# Patient Record
Sex: FEMALE | Race: WHITE | URBAN - METROPOLITAN AREA
[De-identification: names, ages, dates, MRNs, and addresses within clinical notes are randomized per-mention and may not be internally consistent; named-entity substitution may affect disease eponyms.]

---

## 2020-11-03 LAB — HBA1C MFR BLD: 8.7 %

## 2021-06-09 LAB
ALBUMIN SERPL-MCNC: 3.9 G/DL
ALP BLD-CCNC: 69 U/L
ALT SERPL-CCNC: 19 U/L
ANION GAP SERPL CALCULATED.3IONS-SCNC: 14 MMOL/L
AST SERPL-CCNC: 45 U/L
BASOPHILS ABSOLUTE: 2.98 /ΜL
BASOPHILS RELATIVE PERCENT: 5 %
BILIRUB SERPL-MCNC: 0.7 MG/DL (ref 0.1–1.4)
BUN BLDV-MCNC: 30 MG/DL
CALCIUM SERPL-MCNC: 9.5 MG/DL
CHLORIDE BLD-SCNC: 103 MMOL/L
CO2: 23 MMOL/L
CREAT SERPL-MCNC: 1.58 MG/DL
EOSINOPHILS ABSOLUTE: 1.79 /ΜL
EOSINOPHILS RELATIVE PERCENT: 3 %
GFR CALCULATED: NORMAL
GLUCOSE BLD-MCNC: 168 MG/DL
HCT VFR BLD CALC: 43.6 % (ref 36–46)
HEMOGLOBIN: 13.7 G/DL (ref 12–16)
LYMPHOCYTES ABSOLUTE: 5.96 /ΜL
LYMPHOCYTES RELATIVE PERCENT: 10 %
MCH RBC QN AUTO: 29.4 PG
MCHC RBC AUTO-ENTMCNC: 31.4 G/DL
MCV RBC AUTO: 93.6 FL
MONOCYTES ABSOLUTE: 2.98 /ΜL
MONOCYTES RELATIVE PERCENT: 5 %
NEUTROPHILS ABSOLUTE: 36.36 /ΜL
NEUTROPHILS RELATIVE PERCENT: 61 %
PLATELET # BLD: 347 K/ΜL
PMV BLD AUTO: 10.9 FL
POTASSIUM SERPL-SCNC: 4.4 MMOL/L
RBC # BLD: 4.66 10^6/ΜL
SODIUM BLD-SCNC: 140 MMOL/L
TOTAL PROTEIN: 7.3
WBC # BLD: 59.59 10^3/ML

## 2021-06-10 ENCOUNTER — OFFICE VISIT (OUTPATIENT)
Dept: INTERNAL MEDICINE | Age: 66
End: 2021-06-10
Payer: COMMERCIAL

## 2021-06-10 VITALS
HEART RATE: 87 BPM | TEMPERATURE: 96.4 F | WEIGHT: 202.4 LBS | HEIGHT: 60 IN | DIASTOLIC BLOOD PRESSURE: 60 MMHG | SYSTOLIC BLOOD PRESSURE: 130 MMHG | BODY MASS INDEX: 39.74 KG/M2 | OXYGEN SATURATION: 97 %

## 2021-06-10 DIAGNOSIS — B35.1 NAIL FUNGUS: ICD-10-CM

## 2021-06-10 DIAGNOSIS — Z86.39 HISTORY OF DIABETES MELLITUS: ICD-10-CM

## 2021-06-10 DIAGNOSIS — S99.921A INJURY OF RIGHT GREAT TOE, INITIAL ENCOUNTER: Primary | ICD-10-CM

## 2021-06-10 PROCEDURE — 99203 OFFICE O/P NEW LOW 30 MIN: CPT | Performed by: NURSE PRACTITIONER

## 2021-06-10 RX ORDER — LISINOPRIL 20 MG/1
20 TABLET ORAL DAILY
COMMUNITY
End: 2021-08-20 | Stop reason: SDUPTHER

## 2021-06-10 SDOH — ECONOMIC STABILITY: FOOD INSECURITY: WITHIN THE PAST 12 MONTHS, YOU WORRIED THAT YOUR FOOD WOULD RUN OUT BEFORE YOU GOT MONEY TO BUY MORE.: NEVER TRUE

## 2021-06-10 SDOH — ECONOMIC STABILITY: FOOD INSECURITY: WITHIN THE PAST 12 MONTHS, THE FOOD YOU BOUGHT JUST DIDN'T LAST AND YOU DIDN'T HAVE MONEY TO GET MORE.: NEVER TRUE

## 2021-06-10 ASSESSMENT — PATIENT HEALTH QUESTIONNAIRE - PHQ9
SUM OF ALL RESPONSES TO PHQ QUESTIONS 1-9: 0
SUM OF ALL RESPONSES TO PHQ9 QUESTIONS 1 & 2: 0
SUM OF ALL RESPONSES TO PHQ QUESTIONS 1-9: 0
SUM OF ALL RESPONSES TO PHQ QUESTIONS 1-9: 0
1. LITTLE INTEREST OR PLEASURE IN DOING THINGS: 0
2. FEELING DOWN, DEPRESSED OR HOPELESS: 0

## 2021-06-10 ASSESSMENT — SOCIAL DETERMINANTS OF HEALTH (SDOH): HOW HARD IS IT FOR YOU TO PAY FOR THE VERY BASICS LIKE FOOD, HOUSING, MEDICAL CARE, AND HEATING?: NOT HARD AT ALL

## 2021-06-10 NOTE — PROGRESS NOTES
Nayan Shen (:  1955) is a 76 y.o. female, New patient, here for evaluation of the following chief complaint(s):  Toe Injury (hit toe, pt states she is a diabetic and is afraid an infection x2-3 days ago pt states she has leukimia  )      Vitals:    06/10/21 1628   BP: 130/60   Pulse: 87   Temp: 96.4 °F (35.8 °C)   SpO2: 97%       ASSESSMENT/PLAN:  1. Injury of right great toe, initial encounter  -     Cheyenne Limon DPM, Podiatry, D[place]  2. Nail fungus  -     Cheyenne Limon DPM, Podiatry, Dima  3. History of diabetes mellitus  -     Cheyenne Limon DPM, Podiatrsharmila, Dima        Return for follow up with PCP, establish care. SUBJECTIVE/OBJECTIVE:    Other  This is a new problem. Episode onset: x2-3 days ago, hit Right great toe on end table, has some blood under the nail. Worried about infection, pt diabetic. The problem occurs constantly. The problem has been unchanged. Associated symptoms include joint swelling and myalgias. Pertinent negatives include no arthralgias, chest pain, chills, congestion, coughing, fatigue, fever, neck pain, numbness, rash, sore throat or weakness. The symptoms are aggravated by walking. She has tried nothing for the symptoms. Review of Systems   Constitutional: Negative for chills, fatigue and fever. HENT: Negative for congestion, facial swelling, sore throat and trouble swallowing. Respiratory: Negative for cough, chest tightness, shortness of breath and wheezing. Cardiovascular: Negative for chest pain and palpitations. Musculoskeletal: Positive for joint swelling and myalgias. Negative for arthralgias and neck pain. Skin: Positive for wound. Negative for pallor and rash. Neurological: Negative for weakness, light-headedness and numbness. Physical Exam  Vitals reviewed. Constitutional:       General: She is not in acute distress. Appearance: She is well-groomed.    Cardiovascular:      Rate and Rhythm: Normal rate and regular rhythm. Heart sounds: Normal heart sounds, S1 normal and S2 normal.   Pulmonary:      Effort: Pulmonary effort is normal. No respiratory distress. Breath sounds: Normal air entry. No decreased breath sounds, wheezing, rhonchi or rales. Feet:      Right foot:      Toenail Condition: Right toenails are abnormally thick and long. Fungal disease present. Left foot:      Toenail Condition: Left toenails are abnormally thick and long. Fungal disease present. Comments: Pt able to move/bend right great toe. No bony pain. No discoloration or bruising noted. Scant amount of blood noted near the mid to lateral cuticle. Pt denied increased pressure under the nail. Bilateral toenails are thick, yellowed, and long. No drainage, erythema, or signs of bacterial infection noted. Skin:     General: Skin is warm and dry. Neurological:      Mental Status: She is alert and oriented to person, place, and time. Psychiatric:         Mood and Affect: Mood normal.         Behavior: Behavior is cooperative. An electronic signature was used to authenticate this note.     --LELAND Hargrove

## 2021-06-11 ASSESSMENT — ENCOUNTER SYMPTOMS
FACIAL SWELLING: 0
TROUBLE SWALLOWING: 0
SORE THROAT: 0
SHORTNESS OF BREATH: 0
WHEEZING: 0
CHEST TIGHTNESS: 0
COUGH: 0

## 2021-06-23 ENCOUNTER — OFFICE VISIT (OUTPATIENT)
Dept: INTERNAL MEDICINE | Age: 66
End: 2021-06-23
Payer: COMMERCIAL

## 2021-06-23 VITALS
TEMPERATURE: 97.2 F | SYSTOLIC BLOOD PRESSURE: 128 MMHG | OXYGEN SATURATION: 96 % | WEIGHT: 208 LBS | DIASTOLIC BLOOD PRESSURE: 76 MMHG | BODY MASS INDEX: 40.84 KG/M2 | HEART RATE: 66 BPM | HEIGHT: 60 IN

## 2021-06-23 DIAGNOSIS — E03.9 ACQUIRED HYPOTHYROIDISM: ICD-10-CM

## 2021-06-23 DIAGNOSIS — Z79.4 DIABETES MELLITUS TYPE 2, INSULIN DEPENDENT (HCC): ICD-10-CM

## 2021-06-23 DIAGNOSIS — E11.9 DIABETES MELLITUS TYPE 2, INSULIN DEPENDENT (HCC): ICD-10-CM

## 2021-06-23 DIAGNOSIS — I10 ESSENTIAL HYPERTENSION: ICD-10-CM

## 2021-06-23 LAB — HBA1C MFR BLD: 5.7 %

## 2021-06-23 PROCEDURE — 99214 OFFICE O/P EST MOD 30 MIN: CPT | Performed by: PHYSICIAN ASSISTANT

## 2021-06-23 PROCEDURE — 83036 HEMOGLOBIN GLYCOSYLATED A1C: CPT | Performed by: PHYSICIAN ASSISTANT

## 2021-06-23 RX ORDER — LEVOTHYROXINE SODIUM 0.12 MG/1
125 TABLET ORAL DAILY
Qty: 30 TABLET | Refills: 0 | Status: SHIPPED | OUTPATIENT
Start: 2021-06-23 | End: 2021-07-23

## 2021-06-23 RX ORDER — INSULIN GLARGINE 100 [IU]/ML
26 INJECTION, SOLUTION SUBCUTANEOUS NIGHTLY
Qty: 2 VIAL | Refills: 0 | Status: SHIPPED | OUTPATIENT
Start: 2021-06-23 | End: 2021-09-02

## 2021-06-23 RX ORDER — LEVOTHYROXINE SODIUM 0.12 MG/1
125 TABLET ORAL DAILY
COMMUNITY
End: 2021-06-23 | Stop reason: SDUPTHER

## 2021-06-23 RX ORDER — FUROSEMIDE 20 MG/1
20 TABLET ORAL 2 TIMES DAILY
COMMUNITY
End: 2022-07-05 | Stop reason: ALTCHOICE

## 2021-06-23 RX ORDER — INSULIN GLARGINE 100 [IU]/ML
INJECTION, SOLUTION SUBCUTANEOUS NIGHTLY
COMMUNITY
End: 2021-06-23 | Stop reason: SDUPTHER

## 2021-06-23 NOTE — PROGRESS NOTES
21    Flavia Horton (: 1955) is a 78 y.o. female, Established patient, here for evaluation of the following chief complaint(s):  New Patient (Pt needs refills on all medications. Prev pcp Dr. Taya Jason) and Health Maintenance (Pt refused mammogram, Dexa scan, Colonoscopy. Pt had BW done at  approx 2wks ago. )      HPI    New patient to establish care  Prev PCP- MIGUEL ANGEL Levine , last visit 2020     Dm- on Metformin 500 mg BID, Lantus 24-26 units HS, Novolog- 15 units with meals . No incidence of hypoglycemia for months . Fasting range- 130's-150's  , Does not take metformin . Last A1C was 8.7 in 2020, so patient began to eat better . No episodes of hypoglycemia   Hypothyroid- on Synthroid  HTN- stable on Lisinopril       Review of Systems   Constitutional: Negative. HENT: Negative. Respiratory: Negative. Cardiovascular: Negative. Gastrointestinal: Negative. Endocrine: Negative. Genitourinary: Negative. Musculoskeletal: Negative. Neurological: Negative. Psychiatric/Behavioral: Negative. Prior to Visit Medications    Medication Sig Taking?  Authorizing Provider   vitamin D (CHOLECALCIFEROL) 25 MCG (1000 UT) TABS tablet Take 1,000 Units by mouth daily Yes Historical Provider, MD   furosemide (LASIX) 20 MG tablet Take 20 mg by mouth 2 times daily Yes Historical Provider, MD   levothyroxine (SYNTHROID) 125 MCG tablet Take 1 tablet by mouth Daily Yes PAULY Vasquez   insulin glargine (LANTUS) 100 UNIT/ML injection vial Inject 26 Units into the skin nightly Yes PAULY Vasquez   insulin aspart (NOVOLOG) 100 UNIT/ML injection vial inject 12 units subcutaneously three times a day before meals Yes PAULY Vasquez   lisinopril (PRINIVIL;ZESTRIL) 20 MG tablet Take 20 mg by mouth daily Yes Historical Provider, MD   imatinib (GLEEVEC) 100 MG chemo tablet Take 200 mg by mouth daily  Patient not taking: Reported on 2021  Historical Provider, MD appearance. HENT:      Head: Normocephalic and atraumatic. Eyes:      Extraocular Movements: Extraocular movements intact. Conjunctiva/sclera: Conjunctivae normal.      Pupils: Pupils are equal, round, and reactive to light. Cardiovascular:      Rate and Rhythm: Normal rate and regular rhythm. Pulses: Normal pulses. Heart sounds: Normal heart sounds. Pulmonary:      Effort: Pulmonary effort is normal.      Breath sounds: Normal breath sounds. Abdominal:      General: Bowel sounds are normal.      Palpations: Abdomen is soft. Musculoskeletal:         General: Normal range of motion. Cervical back: Normal range of motion and neck supple. Skin:     General: Skin is warm. Neurological:      General: No focal deficit present. Mental Status: She is alert. Psychiatric:         Mood and Affect: Mood normal.         Behavior: Behavior normal.         Thought Content: Thought content normal.         Judgment: Judgment normal.       Results for POC orders placed in visit on 06/23/21   POCT glycosylated hemoglobin (Hb A1C)   Result Value Ref Range    Hemoglobin A1C 5.7 %           ASSESSMENT/PLAN:  1. Diabetes mellitus type 2, insulin dependent (HCC)  - POCT glycosylated hemoglobin (Hb A1C)- stable at 5.7 , without hypoglycemia   - continue medication and diet   - reduce to 12 units if insulin with meals , per the prescription, to prevent hypoglycemia   - monitor glucose closely, and return if any reading under 100 with symptoms     2. Essential hypertension  - controlled , continue Lisinopril     3. Acquired hypothyroidism  - stable, continue Synthroid , needs labs           Return for AWV, fasting labs. An  electronic signature was used to authenticate this note.     --PAULY Crane on 1/29/2021 at 12:12 PM

## 2021-06-30 ASSESSMENT — ENCOUNTER SYMPTOMS
RESPIRATORY NEGATIVE: 1
GASTROINTESTINAL NEGATIVE: 1

## 2021-07-01 ENCOUNTER — VIRTUAL VISIT (OUTPATIENT)
Dept: INTERNAL MEDICINE | Age: 66
End: 2021-07-01
Payer: COMMERCIAL

## 2021-07-01 DIAGNOSIS — G47.00 INSOMNIA, UNSPECIFIED TYPE: Primary | ICD-10-CM

## 2021-07-01 PROCEDURE — 99442 PR PHYS/QHP TELEPHONE EVALUATION 11-20 MIN: CPT | Performed by: PHYSICIAN ASSISTANT

## 2021-07-01 RX ORDER — TRAZODONE HYDROCHLORIDE 50 MG/1
50 TABLET ORAL NIGHTLY
Qty: 30 TABLET | Refills: 0 | Status: SHIPPED | OUTPATIENT
Start: 2021-07-01 | End: 2021-07-26

## 2021-07-01 NOTE — PROGRESS NOTES
Gino Carpenter (: 1955) is a 78 y.o. female, Established patient, here for evaluation of the following chief complaint(s): Insomnia (Pt states that she has not slept in 3wks.)        ASSESSMENT/PLAN:  1. Insomnia, unspecified type  - will start low dose Trazodone , and she can split the tablets if needed   - traZODone (DESYREL) 50 MG tablet; Take 1 tablet by mouth nightly  Dispense: 30 tablet; Refill: 0  - f/u if needed     No follow-ups on file. SUBJECTIVE/OBJECTIVE:  HPI      Insomnia x 3 weeks  States this started all of a sudden   States she is only getting about an hour of sleep       Review of Systems   Psychiatric/Behavioral: Positive for sleep disturbance. Negative for confusion, decreased concentration and dysphoric mood. The patient is not nervous/anxious. No flowsheet data found. Physical Exam  Pulmonary:      Effort: Pulmonary effort is normal.   Neurological:      General: No focal deficit present. Mental Status: She is alert. Psychiatric:         Mood and Affect: Mood normal.         Behavior: Behavior normal.         Thought Content: Thought content normal.         Judgment: Judgment normal.         There were no vitals filed for this visit. On this date 2021 I have spent 11 minutes reviewing previous notes, test results and face to face (virtual) with the patient discussing the diagnosis and importance of compliance with the treatment plan as well as documenting on the day of the visit. Gino Carpenter is a 78 y.o. female being evaluated by a Virtual Visit (video visit) encounter to address concerns as mentioned above. A caregiver was present when appropriate. Due to this being a TeleHealth encounter (During [facility]- public health emergency), evaluation of the following organ systems was limited: Vitals/Constitutional/EENT/Resp/CV/GI//MS/Neuro/Skin/Heme-Lymph-Imm.   Pursuant to the emergency declaration under the [address] [facility], [facility] waiver authority and the [facility] Appropriations Act, this Virtual Visit was conducted with patient's (and/or legal guardian's) consent, to reduce the patient's risk of exposure to COVID-19 and provide necessary medical care. The patient (and/or legal guardian) has also been advised to contact this office for worsening conditions or problems, and seek emergency medical treatment and/or call 911 if deemed necessary. Patient identification was verified at the start of the visit: Yes    Services were provided through a video synchronous discussion virtually to substitute for in-person clinic visit. Patient was located at home and provider was located in office or at home. An electronic signature was used to authenticate this note.     --PAULY Elias

## 2021-07-26 DIAGNOSIS — G47.00 INSOMNIA, UNSPECIFIED TYPE: ICD-10-CM

## 2021-07-26 NOTE — TELEPHONE ENCOUNTER
Requesting medication refill. Please approve or deny this request.    Rx requested:  Requested Prescriptions     Pending Prescriptions Disp Refills    traZODone (DESYREL) 50 MG tablet [Pharmacy Med Name: TRAZODONE 50 MG TABLET] 60 tablet 0     Sig: take 1 tablet by mouth nightly       Last Office Visit, reason seen and by who:   7/1/2021, Insomnia, Dr. Parag Luna: Darnell Slates     Return if symptoms worsen or fail to improve.         PATIENT CONTACTED FOR A FOLLOW UP APPT:   YES OR NO    See Below    Next Visit Date:  Future Appointments   Date Time Provider Radha Vuong   7/28/2021  9:30 AM PAULY Infante [facility]

## 2021-07-27 RX ORDER — TRAZODONE HYDROCHLORIDE 50 MG/1
50 TABLET ORAL NIGHTLY
Qty: 60 TABLET | Refills: 0 | Status: SHIPPED | OUTPATIENT
Start: 2021-07-27 | End: 2021-09-10 | Stop reason: ALTCHOICE

## 2021-07-27 NOTE — TELEPHONE ENCOUNTER
Ria HUNG Mlox [address] [facility] Clinical Staff  Subject: Refill Request     QUESTIONS   Name of Medication? levothyroxine (SYNTHROID) 125 MCG tablet   Patient-reported dosage and instructions? 1x a day   How many days do you have left? 1   Preferred Pharmacy? [address] [place] [address] phone number (if available)?  [phone]

## 2021-07-28 NOTE — TELEPHONE ENCOUNTER
----- Message from Rod Sherman sent at 7/27/2021  2:52 PM EDT -----  Subject: Refill Request    QUESTIONS  Name of Medication? levothyroxine (SYNTHROID) 125 MCG tablet  Patient-reported dosage and instructions? 1x a day   How many days do you have left? 1  Preferred Pharmacy? [address] phone number (if available)? [phone]  ---------------------------------------------------------------------------  --------------  Lizet ERNANDEZ  What is the best way for the office to contact you? OK to leave message on   voicemail  Preferred Call Back Phone Number?  [phone]

## 2021-08-02 ENCOUNTER — TELEPHONE (OUTPATIENT)
Dept: INTERNAL MEDICINE | Age: 66
End: 2021-08-02

## 2021-08-02 NOTE — LETTER
[facility]  [place] [address]  Phone: [phone]  Fax: [phone]      August 20, 2021     Zoraida Francisco [address] [address] [address] [place] [address]      Dear Relda Dancer:    I am writing you because I have been informed of your missed appointment(s). We ask that you please call 24 hours in advance if you are unable to make your appointment so that we can give that time to another patient in need. We care about you and the management of your healthcare and want to make sure that you follow up as recommended. I have to also mention, that in an effort to provide quality care and timely appointments to all my patients, it is our policy that patients be discharged from the practice if they do not show for three scheduled appointments. You would be informed of this termination by mail, and would have 30 days from the date of discharge to locate another physician. We're sorry you were unable to keep your appointment and hope that you are doing well. We would like to continue treating your healthcare needs. Please call the office to let us know your plans for treatment and to reschedule your appointment.      Sincerely,        PAULY Solorio

## 2021-08-06 ENCOUNTER — TELEPHONE (OUTPATIENT)
Dept: INTERNAL MEDICINE | Age: 66
End: 2021-08-06

## 2021-08-06 NOTE — TELEPHONE ENCOUNTER
Pharmacist called, says that it is recommended that the pt be on a statin because of age group and Dx of Dm. Please review at next OV if necessary. Pharmacist no. [phone] Eleno Johnson. If not concerned with putting a pt on a statin please disregard this message.

## 2021-08-20 NOTE — TELEPHONE ENCOUNTER
Requesting medication refill. Please approve or deny this request.    Rx requested:  Requested Prescriptions     Pending Prescriptions Disp Refills    lisinopril (PRINIVIL;ZESTRIL) 20 MG tablet 30 tablet      Sig: Take 1 tablet by mouth daily       Last Office Visit:   7/1/2021        REASON LAST SEEN AND BY WHO:    Insomnia; [facility]     FOLLOW UP PLAN FROM LAST PCP VISIT: COPY AND PASTE FROM LAST PCP NOTE     Return if symptoms worsen or fail to improve. PATIENT CONTACTED FOR A FOLLOW UP APPT: YES OR NO    NO     Next Visit Date:  No future appointments.

## 2021-08-23 RX ORDER — LISINOPRIL 20 MG/1
20 TABLET ORAL DAILY
Qty: 90 TABLET | Refills: 0 | Status: SHIPPED | OUTPATIENT
Start: 2021-08-23 | End: 2021-09-10 | Stop reason: SDUPTHER

## 2021-09-01 NOTE — TELEPHONE ENCOUNTER
Requesting medication refill. Please approve or deny this request.    Rx requested:  Requested Prescriptions     Pending Prescriptions Disp Refills    insulin aspart (NOVOLOG) 100 UNIT/ML injection vial [Pharmacy Med Name: NOVOLOG 100 UNIT/ML VIAL] 10 mL      Sig: inject 12 units subcutaneously three times a day before meals    LANTUS 100 UNIT/ML injection vial [Pharmacy Med Name: LANTUS 100 UNIT/ML VIAL] 20 mL      Sig: inject 26 units subcutaneously every evening       Last Office Visit:   7/1/2021        REASON LAST SEEN AND BY WHO:    insomnia     FOLLOW UP PLAN FROM LAST PCP VISIT: COPY AND PASTE FROM LAST PCP NOTE    none      PATIENT CONTACTED FOR A FOLLOW UP APPT: YES OR NO    no    Next Visit Date:  No future appointments.

## 2021-09-02 RX ORDER — INSULIN GLARGINE 100 [IU]/ML
INJECTION, SOLUTION SUBCUTANEOUS
Qty: 20 ML | Refills: 0 | Status: SHIPPED | OUTPATIENT
Start: 2021-09-02 | End: 2021-09-10

## 2021-09-03 ENCOUNTER — TELEPHONE (OUTPATIENT)
Dept: INTERNAL MEDICINE | Age: 66
End: 2021-09-03

## 2021-09-10 ENCOUNTER — OFFICE VISIT (OUTPATIENT)
Dept: INTERNAL MEDICINE | Age: 66
End: 2021-09-10
Payer: COMMERCIAL

## 2021-09-10 VITALS
SYSTOLIC BLOOD PRESSURE: 128 MMHG | WEIGHT: 202 LBS | BODY MASS INDEX: 39.66 KG/M2 | HEART RATE: 75 BPM | TEMPERATURE: 97.4 F | OXYGEN SATURATION: 97 % | HEIGHT: 60 IN | DIASTOLIC BLOOD PRESSURE: 67 MMHG

## 2021-09-10 DIAGNOSIS — E11.9 DIABETES MELLITUS TYPE 2, INSULIN DEPENDENT (HCC): ICD-10-CM

## 2021-09-10 DIAGNOSIS — Z12.31 ENCOUNTER FOR SCREENING MAMMOGRAM FOR MALIGNANT NEOPLASM OF BREAST: ICD-10-CM

## 2021-09-10 DIAGNOSIS — G47.00 INSOMNIA, UNSPECIFIED TYPE: ICD-10-CM

## 2021-09-10 DIAGNOSIS — Z12.11 COLON CANCER SCREENING: ICD-10-CM

## 2021-09-10 DIAGNOSIS — Z79.4 DIABETES MELLITUS TYPE 2, INSULIN DEPENDENT (HCC): ICD-10-CM

## 2021-09-10 DIAGNOSIS — I10 ESSENTIAL HYPERTENSION: ICD-10-CM

## 2021-09-10 DIAGNOSIS — Z00.00 ROUTINE GENERAL MEDICAL EXAMINATION AT A HEALTH CARE FACILITY: Primary | ICD-10-CM

## 2021-09-10 DIAGNOSIS — E03.9 ACQUIRED HYPOTHYROIDISM: ICD-10-CM

## 2021-09-10 DIAGNOSIS — Z78.0 ASYMPTOMATIC MENOPAUSAL STATE: ICD-10-CM

## 2021-09-10 PROCEDURE — G0438 PPPS, INITIAL VISIT: HCPCS | Performed by: PHYSICIAN ASSISTANT

## 2021-09-10 RX ORDER — LEVOTHYROXINE SODIUM 0.12 MG/1
TABLET ORAL
Qty: 90 TABLET | Refills: 1 | Status: SHIPPED | OUTPATIENT
Start: 2021-09-10 | End: 2022-04-21

## 2021-09-10 RX ORDER — DOXEPIN HYDROCHLORIDE 25 MG/1
25 CAPSULE ORAL NIGHTLY
Qty: 30 CAPSULE | Refills: 0 | Status: SHIPPED | OUTPATIENT
Start: 2021-09-10 | End: 2021-10-09

## 2021-09-10 RX ORDER — INSULIN GLARGINE 100 [IU]/ML
26 INJECTION, SOLUTION SUBCUTANEOUS NIGHTLY
Qty: 5 PEN | Refills: 3 | Status: SHIPPED | OUTPATIENT
Start: 2021-09-10 | End: 2022-02-25 | Stop reason: SDUPTHER

## 2021-09-10 RX ORDER — LISINOPRIL 20 MG/1
20 TABLET ORAL DAILY
Qty: 90 TABLET | Refills: 1 | Status: SHIPPED | OUTPATIENT
Start: 2021-09-10 | End: 2022-03-31

## 2021-09-10 ASSESSMENT — PATIENT HEALTH QUESTIONNAIRE - PHQ9
SUM OF ALL RESPONSES TO PHQ QUESTIONS 1-9: 1
SUM OF ALL RESPONSES TO PHQ QUESTIONS 1-9: 1
SUM OF ALL RESPONSES TO PHQ9 QUESTIONS 1 & 2: 1
SUM OF ALL RESPONSES TO PHQ QUESTIONS 1-9: 1
2. FEELING DOWN, DEPRESSED OR HOPELESS: 1
1. LITTLE INTEREST OR PLEASURE IN DOING THINGS: 0

## 2021-09-10 ASSESSMENT — LIFESTYLE VARIABLES: HOW OFTEN DO YOU HAVE A DRINK CONTAINING ALCOHOL: 0

## 2021-09-10 NOTE — PROGRESS NOTES
Medicare Annual Wellness Visit  Name: Michelle Hobson Date: 9/10/2021   MRN: [id number] Sex: Female   Age: 78 y.o. Ethnicity: Non- / Non    : 1955 Race: White (non-)      Judene Apley is here for Medicare AWV (wantssomething to help with sleeping, stopped the trazadone due to SE of  insomnia. )    Screenings for behavioral, psychosocial and functional/safety risks, and cognitive dysfunction are all negative except as indicated below. These results, as well as other patient data from the [address] form, are documented in Flowsheets linked to this Encounter. Allergies   Allergen Reactions    Latex Itching    Morphine Other (See Comments)     Pt states that her breathing slowed and blood pressure dropped. Prior to Visit Medications    Medication Sig Taking?  Authorizing Provider   insulin glargine (LANTUS SOLOSTAR) 100 UNIT/ML injection pen Inject 26 Units into the skin nightly Yes PAULY Vasquez   lisinopril (PRINIVIL;ZESTRIL) 20 MG tablet Take 1 tablet by mouth daily Yes PAULY Gomez   levothyroxine (SYNTHROID) 125 MCG tablet take 1 tablet by mouth once daily Yes PAULY Haq   insulin aspart (NOVOLOG) 100 UNIT/ML injection vial inject 12 units subcutaneously three times a day before meals Yes PAULY Haq   vitamin D (CHOLECALCIFEROL) 25 MCG (1000 UT) TABS tablet Take 1,000 Units by mouth daily Yes Historical Provider, MD   furosemide (LASIX) 20 MG tablet Take 20 mg by mouth 2 times daily Yes Historical Provider, MD   imatinib (GLEEVEC) 100 MG chemo tablet Take 200 mg by mouth daily  Yes Historical Provider, MD         Past Medical History:   Diagnosis Date    Diabetes mellitus ([facility]     Hypertension     Hypothyroidism     Type 2 diabetes mellitus without complication ([facility]        Past Surgical History:   Procedure Laterality Date    CATARACT REMOVAL           Family History   Problem Relation Age of Onset    Cancer Mother CareTeam (Including outside providers/suppliers regularly involved in providing care):   Patient Care Team:  Saintclair Muckle, PA as PCP - General (Physician Assistant)  Saintclair Muckle, PA as PCP - [facility]    Wt Readings from Last 3 Encounters:   09/10/21 202 lb (91.6 kg)   06/23/21 208 lb (94.3 kg)   06/10/21 202 lb 6.4 oz (91.8 kg)     Vitals:    09/10/21 1128   BP: 128/67   Site: Left Upper Arm   Position: Sitting   Cuff Size: Large Adult   Pulse: 75   Temp: 97.4 °F (36.3 °C)   TempSrc: Temporal   SpO2: 97%   Weight: 202 lb (91.6 kg)   Height: 5' (1.524 m)     Body mass index is 39.45 kg/m². Based upon direct observation of the patient, evaluation of cognition reveals recent and remote memory intact.     General Appearance: alert and oriented to person, place and time, well developed and well- nourished, in no acute distress  Skin: warm and dry, no rash or erythema  Head: normocephalic and atraumatic  Eyes: pupils equal, round, and reactive to light, extraocular eye movements intact, conjunctivae normal  ENT: tympanic membrane, external ear and ear canal normal bilaterally, nose without deformity, nasal mucosa and turbinates normal without polyps  Neck: supple and non-tender without mass, no thyromegaly or thyroid nodules, no cervical lymphadenopathy  Pulmonary/Chest: clear to auscultation bilaterally- no wheezes, rales or rhonchi, normal air movement, no respiratory distress  Cardiovascular: normal rate, regular rhythm, normal S1 and S2, no murmurs, rubs, clicks, or gallops, distal pulses intact, no carotid bruits  Abdomen: soft, non-tender, non-distended, normal bowel sounds, no masses or organomegaly  Extremities: no cyanosis, clubbing or edema  Musculoskeletal: normal range of motion, no joint swelling, deformity or tenderness  Neurologic: reflexes normal and symmetric, no cranial nerve deficit, gait, coordination and speech normal    Patient's complete Health Risk Assessment and screening values have been reviewed and are found in Flowsheets. The following problems were reviewed today and where indicated follow up appointments were made and/or referrals ordered. Positive Risk Factor Screenings with Interventions:      Cognitive: Words recalled: 2 Words Recalled  Clock Drawing Test (CDT) Score: (!) Abnormal  Total Score Interpretation: Positive Mini-Cog  Cognitive Impairment Interventions:  · Patient advised to follow-up in this office for further evaluation and treatment within 6 month(s)         General Health and ACP:  General  In general, how would you say your health is?: Fair  In the past 7 days, have you experienced any of the following? New or Increased Pain, New or Increased Fatigue, Loneliness, Social Isolation, Stress or Anger?: (!) Loneliness  Do you get the social and emotional support that you need?: Yes  Do you have a Living Will?: Yes  Advance Directives     Power of [address] Will ACP-Advance Directive ACP-Power of     Not on File Not on File Not on File Not on File      General Health Risk Interventions:  · No Living Will: ACP documents already completed- patient asked to provide copy to the office   · She is DNR     Health Habits/Nutrition:  Health Habits/Nutrition  Do you exercise for at least 20 minutes 2-3 times per week?: Yes  Have you lost any weight without trying in the past 3 months?: No  Do you eat only one meal per day?: No  Have you seen the dentist within the past year?: (!) No  Body mass index: (!) 39.45  Health Habits/Nutrition Interventions:  · Dental exam overdue:  patient encouraged to make appointment with his/her dentist       Personalized Preventive Plan   Current Health Maintenance Status    There is no immunization history on file for this patient.      Health Maintenance   Topic Date Due    TSH testing  Never done    Hepatitis C screen  Never done    Diabetic foot exam  Never done    Diabetic retinal exam  Never done    Lipid screen  Never done    COVID-19 Vaccine (1) Never done    Diabetic microalbuminuria test  Never done    DTaP/Tdap/Td vaccine (1 - Tdap) Never done    Colon cancer screen colonoscopy  Never done    Breast cancer screen  Never done    Shingles Vaccine (1 of 2) Never done    DEXA (modify frequency per FRAX score)  Never done    Pneumococcal 65+ years Vaccine (1 of 1 - PPSV23) 06/18/2019    Flu vaccine (1) 09/01/2021    Potassium monitoring  06/09/2022    Creatinine monitoring  06/09/2022    A1C test (Diabetic or Prediabetic)  06/23/2022    Hepatitis A vaccine  Aged Out    Hib vaccine  Aged Out    Meningococcal (ACWY) vaccine  Aged Out     Recommendations for Calvin Due: see orders and patient instructions/AVS.  . Recommended screening schedule for the next 5-10 years is provided to the patient in written form: see Patient Becca Harrison was seen today for medicare aw. Diagnoses and all orders for this visit:    Routine general medical examination at a health care facility  -     Lipid Panel; Future  -     TSH with Reflex; Future  -     Hepatitis C Antibody; Future    Essential hypertension  -     lisinopril (PRINIVIL;ZESTRIL) 20 MG tablet; Take 1 tablet by mouth daily  -     Controlled     Acquired hypothyroidism  -     levothyroxine (SYNTHROID) 125 MCG tablet; take 1 tablet by mouth once daily    Asymptomatic menopausal state  -     DEXA BONE DENSITY AXIAL SKELETON; Future    Diabetes mellitus type 2, insulin dependent (HCC)  -     Microalbumin / Creatinine Urine Ratio; Future  -     Controlled, A1C at 5.7     Colon cancer screening  -     Cologuard; Future    Encounter for screening mammogram for malignant neoplasm of breast  -     DENNIS DIGITAL SCREEN W OR WO CAD BILATERAL;  Future

## 2021-09-10 NOTE — PATIENT INSTRUCTIONS
Personalized Preventive Plan for Joseph Trimble - 9/10/2021  Medicare offers a range of preventive health benefits. Some of the tests and screenings are paid in full while other may be subject to a deductible, co-insurance, and/or copay. Some of these benefits include a comprehensive review of your medical history including lifestyle, illnesses that may run in your family, and various assessments and screenings as appropriate. After reviewing your medical record and screening and assessments performed today your provider may have ordered immunizations, labs, imaging, and/or referrals for you. A list of these orders (if applicable) as well as your Preventive Care list are included within your After Visit Summary for your review. Other Preventive Recommendations:    · A preventive eye exam performed by an eye specialist is recommended every 1-2 years to screen for glaucoma; cataracts, macular degeneration, and other eye disorders. · A preventive dental visit is recommended every 6 months. · Try to get at least 150 minutes of exercise per week or 10,000 steps per day on a pedometer . · Order or download the FREE \"Exercise & Physical Activity: Your Everyday Guide\" from [employer]. Call 9-243.913.6475 or search [employer] online. · You need 4436-9786 mg of calcium and 5081-6715 IU of vitamin D per day. It is possible to meet your calcium requirement with diet alone, but a vitamin D supplement is usually necessary to meet this goal.  · When exposed to the sun, use a sunscreen that protects against both UVA and UVB radiation with an SPF of 30 or greater. Reapply every 2 to 3 hours or after sweating, drying off with a towel, or swimming. · Always wear a seat belt when traveling in a car. Always wear a helmet when riding a bicycle or motorcycle.

## 2021-09-14 DIAGNOSIS — B18.2 CHRONIC HEPATITIS C WITHOUT HEPATIC COMA (HCC): Primary | ICD-10-CM

## 2021-09-14 DIAGNOSIS — E55.9 VITAMIN D INSUFFICIENCY: Primary | ICD-10-CM

## 2021-09-14 DIAGNOSIS — R74.8 ELEVATED LIVER ENZYMES: ICD-10-CM

## 2021-09-14 RX ORDER — MELATONIN
1000 DAILY
Qty: 90 TABLET | Refills: 1 | Status: SHIPPED | OUTPATIENT
Start: 2021-09-14 | End: 2021-09-14 | Stop reason: CLARIF

## 2021-10-21 NOTE — TELEPHONE ENCOUNTER
Comments:     Last Office Visit (last PCP visit):   9/10/2021    Next Visit Date:  No future appointments. **If hasn't been seen in over a year OR hasn't followed up according to last diabetes/ADHD visit, make appointment for patient before sending refill to provider.     Rx requested:  Requested Prescriptions     Pending Prescriptions Disp Refills    insulin aspart (NOVOLOG) 100 UNIT/ML injection vial [Pharmacy Med Name: NOVOLOG 100 UNIT/ML VIAL] 10 mL 0     Sig: inject 12 milliliters subcutaneously three times a day before meals

## 2021-10-22 ENCOUNTER — TELEPHONE (OUTPATIENT)
Dept: INTERNAL MEDICINE | Age: 66
End: 2021-10-22

## 2021-10-22 NOTE — TELEPHONE ENCOUNTER
Comments:     Pt requesting 2 vials of insulin to be sent to [facility], states she had a conversation about a 3 month supply with provider it was not sent as asked. Please advise. Last Office Visit (last PCP visit):   9/10/2021    Next Visit Date:  No future appointments. **If hasn't been seen in over a year OR hasn't followed up according to last diabetes/ADHD visit, make appointment for patient before sending refill to provider.     Rx requested:  Requested Prescriptions     Pending Prescriptions Disp Refills    insulin aspart (NOVOLOG) 100 UNIT/ML injection vial 10 mL 1     Sig: inject 12 milliliters subcutaneously three times a day before meals

## 2022-01-24 NOTE — TELEPHONE ENCOUNTER
Comments: this is new encounter. Last Office Visit (last PCP visit):   9/10/21 AWV    Next Visit Date:  No future appointments. **If hasn't been seen in over a year OR hasn't followed up according to last diabetes/ADHD visit, make appointment for patient before sending refill to provider.     Rx requested:  Requested Prescriptions     Pending Prescriptions Disp Refills    insulin aspart (NOVOLOG) 100 UNIT/ML injection vial [Pharmacy Med Name: NOVOLOG 100 UNIT/ML VIAL] 10 mL 1     Sig: inject 12 units subcutaneously three times a day before meals

## 2022-01-26 NOTE — TELEPHONE ENCOUNTER
Comments:      Last Office Visit (last PCP visit):   9/10/2021    Next Visit Date:  No future appointments. **If hasn't been seen in over a year OR hasn't followed up according to last diabetes/ADHD visit, make appointment for patient before sending refill to provider.     Rx requested:  Requested Prescriptions     Pending Prescriptions Disp Refills    BD INSULIN SYRINGE U/F 30G X 1/2\" 0.3 ML MISC [Pharmacy Med Name: BD INS SY U/F 0.3ML 30GX12.7MM] 100 each      Sig: use three times a day

## 2022-01-27 RX ORDER — PEN NEEDLE, DIABETIC 29 G X1/2"
NEEDLE, DISPOSABLE MISCELLANEOUS
Qty: 100 EACH | Refills: 5 | Status: SHIPPED | OUTPATIENT
Start: 2022-01-27

## 2022-02-21 NOTE — TELEPHONE ENCOUNTER
Comments: pt aware that she will be due to Return in 6 months (on 3/10/2022) for follow up HTH/D5. She will call back to schedule. Last Office Visit (last PCP visit):   9/10/2021    Next Visit Date:  No future appointments. **If hasn't been seen in over a year OR hasn't followed up according to last diabetes/ADHD visit, make appointment for patient before sending refill to provider.     Rx requested:  Requested Prescriptions     Pending Prescriptions Disp Refills    insulin aspart (NOVOLOG) 100 UNIT/ML injection vial 20 mL 1     Sig: inject 12 milliliters subcutaneously three times a day before meals

## 2022-02-23 NOTE — TELEPHONE ENCOUNTER
Pt has an appt on March 10th     Comments: Return in 6 months (on 3/10/2022) for follow up HTH/D5 , then Medicare Annual Wellness Visit in 1 year. Last Office Visit (last PCP visit):   9/10/2021    Next Visit Date:  Future Appointments   Date Time Provider Radha Vuong   3/10/2022  2:30 PM BECCA [address]       **If hasn't been seen in over a year OR hasn't followed up according to last diabetes/ADHD visit, make appointment for patient before sending refill to provider.     Rx requested:  Requested Prescriptions     Pending Prescriptions Disp Refills    insulin glargine (LANTUS SOLOSTAR) 100 UNIT/ML injection pen 5 pen 3     Sig: Inject 26 Units into the skin nightly

## 2022-02-25 RX ORDER — INSULIN GLARGINE 100 [IU]/ML
26 INJECTION, SOLUTION SUBCUTANEOUS NIGHTLY
Qty: 5 PEN | Refills: 3 | Status: SHIPPED | OUTPATIENT
Start: 2022-02-25 | End: 2022-07-05 | Stop reason: SDUPTHER

## 2022-03-17 NOTE — PROGRESS NOTES
Patient's HM shows they are overdue for DEXA, Mammogram and Cologard. [employer] and  files searched without success. No results to attach to order nor HM updated. No upcoming appointment.

## 2022-03-27 NOTE — TELEPHONE ENCOUNTER
Comments:     Last Office Visit (last PCP visit):   9/10/2021    Next Visit Date:  No future appointments. **If hasn't been seen in over a year OR hasn't followed up according to last diabetes/ADHD visit, make appointment for patient before sending refill to provider.     Rx requested:  Requested Prescriptions     Pending Prescriptions Disp Refills    insulin aspart (NOVOLOG) 100 UNIT/ML injection vial 20 mL 0     Sig: inject 12 milliliters subcutaneously three times a day before meals

## 2022-03-31 DIAGNOSIS — G47.00 INSOMNIA, UNSPECIFIED TYPE: ICD-10-CM

## 2022-03-31 DIAGNOSIS — I10 ESSENTIAL HYPERTENSION: ICD-10-CM

## 2022-03-31 RX ORDER — LISINOPRIL 20 MG/1
TABLET ORAL
Qty: 90 TABLET | Refills: 0 | Status: SHIPPED | OUTPATIENT
Start: 2022-03-31 | End: 2022-07-01

## 2022-03-31 RX ORDER — DOXEPIN HYDROCHLORIDE 25 MG/1
25 CAPSULE ORAL NIGHTLY
Qty: 90 CAPSULE | Refills: 0 | Status: SHIPPED | OUTPATIENT
Start: 2022-03-31 | End: 2022-07-01

## 2022-03-31 NOTE — TELEPHONE ENCOUNTER
Comments:    Last Office Visit (last PCP visit):   9/10/2021    Next Visit Date:  No future appointments. **If hasn't been seen in over a year OR hasn't followed up according to last diabetes/ADHD visit, make appointment for patient before sending refill to provider.     Rx requested:  Requested Prescriptions     Pending Prescriptions Disp Refills    lisinopril (PRINIVIL;ZESTRIL) 20 MG tablet [Pharmacy Med Name: LISINOPRIL 20 MG TABLET] 90 tablet 1     Sig: take 1 tablet by mouth once daily    doxepin (SINEQUAN) 25 MG capsule [Pharmacy Med Name: DOXEPIN 25 MG CAPSULE] 90 capsule 1     Sig: take 1 capsule by mouth nightly

## 2022-04-11 ENCOUNTER — TELEPHONE (OUTPATIENT)
Dept: INTERNAL MEDICINE | Age: 67
End: 2022-04-11

## 2022-04-19 DIAGNOSIS — E03.9 ACQUIRED HYPOTHYROIDISM: ICD-10-CM

## 2022-04-19 NOTE — TELEPHONE ENCOUNTER
Comments:     Last Office Visit (last PCP visit):   9/10/2021    Next Visit Date:  No future appointments. **If hasn't been seen in over a year OR hasn't followed up according to last diabetes/ADHD visit, make appointment for patient before sending refill to provider.     Rx requested:  Requested Prescriptions     Pending Prescriptions Disp Refills    levothyroxine (SYNTHROID) 125 MCG tablet [Pharmacy Med Name: LEVOTHYROXINE 125 MCG TABLET] 90 tablet 1     Sig: take 1 tablet by mouth once daily

## 2022-04-21 RX ORDER — LEVOTHYROXINE SODIUM 0.12 MG/1
TABLET ORAL
Qty: 90 TABLET | Refills: 1 | Status: SHIPPED | OUTPATIENT
Start: 2022-04-21 | End: 2022-07-05 | Stop reason: SDUPTHER

## 2022-04-25 LAB
AVERAGE GLUCOSE: NORMAL
HBA1C MFR BLD: 5 %

## 2022-04-28 ENCOUNTER — COMMUNITY OUTREACH (OUTPATIENT)
Dept: INTERNAL MEDICINE | Age: 67
End: 2022-04-28

## 2022-04-28 NOTE — PROGRESS NOTES
Patient's HM shows they are overdue for Mammogram, Colorectal Screening and DEXA  [employer] and  files searched without success. No results to attach to order nor HM updated. No upcoming appointment.      Lmom to reschedule appt and let us know if she had completed ordered tests

## 2022-06-29 DIAGNOSIS — I10 ESSENTIAL HYPERTENSION: ICD-10-CM

## 2022-06-29 DIAGNOSIS — G47.00 INSOMNIA, UNSPECIFIED TYPE: ICD-10-CM

## 2022-06-29 NOTE — TELEPHONE ENCOUNTER
Comments:     Last Office Visit (last PCP visit):   9/10/2021    Next Visit Date:  No future appointments. **If hasn't been seen in over a year OR hasn't followed up according to last diabetes/ADHD visit, make appointment for patient before sending refill to provider.     Rx requested:  Requested Prescriptions     Pending Prescriptions Disp Refills    lisinopril (PRINIVIL;ZESTRIL) 20 MG tablet [Pharmacy Med Name: LISINOPRIL 20 MG TABLET] 90 tablet 0     Sig: take 1 tablet by mouth once daily    doxepin (SINEQUAN) 25 MG capsule [Pharmacy Med Name: DOXEPIN 25 MG CAPSULE] 90 capsule 0     Sig: take 1 capsule by mouth nightly

## 2022-06-30 ENCOUNTER — TELEPHONE (OUTPATIENT)
Dept: INTERNAL MEDICINE | Age: 67
End: 2022-06-30

## 2022-07-01 DIAGNOSIS — I10 ESSENTIAL HYPERTENSION: ICD-10-CM

## 2022-07-01 RX ORDER — LISINOPRIL 20 MG/1
TABLET ORAL
Qty: 90 TABLET | Refills: 0 | Status: SHIPPED | OUTPATIENT
Start: 2022-07-01 | End: 2022-07-05 | Stop reason: SDUPTHER

## 2022-07-01 RX ORDER — LISINOPRIL 20 MG/1
TABLET ORAL
Qty: 90 TABLET | Refills: 0 | OUTPATIENT
Start: 2022-07-01

## 2022-07-01 RX ORDER — DOXEPIN HYDROCHLORIDE 25 MG/1
25 CAPSULE ORAL NIGHTLY
Qty: 90 CAPSULE | Refills: 0 | Status: SHIPPED | OUTPATIENT
Start: 2022-07-01 | End: 2022-07-05 | Stop reason: ALTCHOICE

## 2022-07-05 ENCOUNTER — OFFICE VISIT (OUTPATIENT)
Dept: INTERNAL MEDICINE | Age: 67
End: 2022-07-05
Payer: MEDICARE

## 2022-07-05 VITALS
HEART RATE: 66 BPM | BODY MASS INDEX: 40.84 KG/M2 | HEIGHT: 60 IN | DIASTOLIC BLOOD PRESSURE: 67 MMHG | WEIGHT: 208 LBS | SYSTOLIC BLOOD PRESSURE: 126 MMHG

## 2022-07-05 DIAGNOSIS — E11.9 DIABETES MELLITUS TYPE 2, INSULIN DEPENDENT (HCC): ICD-10-CM

## 2022-07-05 DIAGNOSIS — Z79.4 DIABETES MELLITUS TYPE 2, INSULIN DEPENDENT (HCC): ICD-10-CM

## 2022-07-05 DIAGNOSIS — I10 ESSENTIAL HYPERTENSION: Primary | ICD-10-CM

## 2022-07-05 DIAGNOSIS — Z12.31 SCREENING MAMMOGRAM, ENCOUNTER FOR: ICD-10-CM

## 2022-07-05 DIAGNOSIS — Z53.20 COLON CANCER SCREENING DECLINED: ICD-10-CM

## 2022-07-05 DIAGNOSIS — E03.9 ACQUIRED HYPOTHYROIDISM: ICD-10-CM

## 2022-07-05 DIAGNOSIS — R01.1 MURMUR: ICD-10-CM

## 2022-07-05 DIAGNOSIS — B18.2 CHRONIC HEPATITIS C WITHOUT HEPATIC COMA (HCC): ICD-10-CM

## 2022-07-05 DIAGNOSIS — N18.32 STAGE 3B CHRONIC KIDNEY DISEASE (HCC): ICD-10-CM

## 2022-07-05 DIAGNOSIS — E66.01 OBESITY, CLASS III, BMI 40-49.9 (MORBID OBESITY) (HCC): ICD-10-CM

## 2022-07-05 DIAGNOSIS — N18.4 CKD (CHRONIC KIDNEY DISEASE) STAGE 4, GFR 15-29 ML/MIN (HCC): ICD-10-CM

## 2022-07-05 DIAGNOSIS — I10 ESSENTIAL HYPERTENSION: ICD-10-CM

## 2022-07-05 DIAGNOSIS — C92.10 CML (CHRONIC MYELOID LEUKEMIA) (HCC): ICD-10-CM

## 2022-07-05 DIAGNOSIS — Z28.21 COVID-19 VACCINATION DECLINED: ICD-10-CM

## 2022-07-05 PROBLEM — Z92.29 COVID-19 VACCINE SERIES COMPLETED: Status: ACTIVE | Noted: 2022-07-05

## 2022-07-05 LAB
ANION GAP SERPL CALCULATED.3IONS-SCNC: 9 MEQ/L (ref 9–15)
BUN BLDV-MCNC: 46 MG/DL (ref 8–23)
CALCIUM SERPL-MCNC: 9.1 MG/DL (ref 8.5–9.9)
CHLORIDE BLD-SCNC: 111 MEQ/L (ref 95–107)
CO2: 23 MEQ/L (ref 20–31)
CREAT SERPL-MCNC: 1.97 MG/DL (ref 0.5–0.9)
GFR AFRICAN AMERICAN: 30.5
GFR NON-AFRICAN AMERICAN: 25.2
GLUCOSE BLD-MCNC: 245 MG/DL (ref 70–99)
POTASSIUM SERPL-SCNC: 5.2 MEQ/L (ref 3.4–4.9)
SODIUM BLD-SCNC: 143 MEQ/L (ref 135–144)

## 2022-07-05 PROCEDURE — 3017F COLORECTAL CA SCREEN DOC REV: CPT | Performed by: PHYSICIAN ASSISTANT

## 2022-07-05 PROCEDURE — 2022F DILAT RTA XM EVC RTNOPTHY: CPT | Performed by: PHYSICIAN ASSISTANT

## 2022-07-05 PROCEDURE — G8427 DOCREV CUR MEDS BY ELIG CLIN: HCPCS | Performed by: PHYSICIAN ASSISTANT

## 2022-07-05 PROCEDURE — 3046F HEMOGLOBIN A1C LEVEL >9.0%: CPT | Performed by: PHYSICIAN ASSISTANT

## 2022-07-05 PROCEDURE — 1090F PRES/ABSN URINE INCON ASSESS: CPT | Performed by: PHYSICIAN ASSISTANT

## 2022-07-05 PROCEDURE — 99214 OFFICE O/P EST MOD 30 MIN: CPT | Performed by: PHYSICIAN ASSISTANT

## 2022-07-05 PROCEDURE — 1036F TOBACCO NON-USER: CPT | Performed by: PHYSICIAN ASSISTANT

## 2022-07-05 PROCEDURE — G8400 PT W/DXA NO RESULTS DOC: HCPCS | Performed by: PHYSICIAN ASSISTANT

## 2022-07-05 PROCEDURE — 1123F ACP DISCUSS/DSCN MKR DOCD: CPT | Performed by: PHYSICIAN ASSISTANT

## 2022-07-05 PROCEDURE — G8417 CALC BMI ABV UP PARAM F/U: HCPCS | Performed by: PHYSICIAN ASSISTANT

## 2022-07-05 RX ORDER — AMMONIUM LACTATE 12 G/100G
LOTION TOPICAL
COMMUNITY
Start: 2022-04-30

## 2022-07-05 RX ORDER — LISINOPRIL 20 MG/1
TABLET ORAL
Qty: 90 TABLET | Refills: 1 | Status: SHIPPED | OUTPATIENT
Start: 2022-07-05

## 2022-07-05 RX ORDER — LEVOTHYROXINE SODIUM 0.12 MG/1
TABLET ORAL
Qty: 90 TABLET | Refills: 1 | Status: SHIPPED | OUTPATIENT
Start: 2022-07-05

## 2022-07-05 RX ORDER — INSULIN ASPART 100 [IU]/ML
7 INJECTION, SOLUTION INTRAVENOUS; SUBCUTANEOUS
Qty: 5 PEN | Refills: 3 | Status: SHIPPED | OUTPATIENT
Start: 2022-07-05

## 2022-07-05 RX ORDER — TORSEMIDE 20 MG/1
20 TABLET ORAL DAILY
Qty: 90 TABLET | Refills: 1 | Status: SHIPPED | OUTPATIENT
Start: 2022-07-05

## 2022-07-05 RX ORDER — TORSEMIDE 20 MG/1
20 TABLET ORAL DAILY
COMMUNITY
Start: 2022-04-30 | End: 2022-07-05 | Stop reason: SDUPTHER

## 2022-07-05 RX ORDER — INSULIN GLARGINE 100 [IU]/ML
20 INJECTION, SOLUTION SUBCUTANEOUS NIGHTLY
Qty: 5 PEN | Refills: 3 | Status: SHIPPED | OUTPATIENT
Start: 2022-07-05

## 2022-07-05 RX ORDER — TORSEMIDE 20 MG/1
TABLET ORAL
COMMUNITY
Start: 2022-04-30 | End: 2022-07-05

## 2022-07-05 ASSESSMENT — ENCOUNTER SYMPTOMS
GASTROINTESTINAL NEGATIVE: 1
RESPIRATORY NEGATIVE: 1

## 2022-07-05 NOTE — PROGRESS NOTES
Frankie Bowden (: 1955) is a 75 y.o. female, Established patient, here for evaluation of the following chief complaint(s):  Hypertension (novolog, lantus, lisinopril,vit d3, synthroid)        ASSESSMENT/PLAN:  1. Essential hypertension  - controlled, continue Lisinopril   - lisinopril (PRINIVIL;ZESTRIL) 20 MG tablet; take 1 tablet by mouth once daily  Dispense: 90 tablet; Refill: 1    2. Acquired hypothyroidism  - stable. continue synthroid   - last TSH done 2022- normal    - levothyroxine (SYNTHROID) 125 MCG tablet; take 1 tablet by mouth once daily  Dispense: 90 tablet; Refill: 1    3. Chronic hepatitis C without hepatic coma ([facility] [address]  - sees hepatologist     4. CML (chronic myeloid leukemia) ([facility] [address]  - sees oncology in [place]     5. Diabetes mellitus type 2, insulin dependent (HCC)  6 CKD   - new labs today   - last A1C was 5.0  - no hypoglycemia   - continue current meds      6. Colon cancer screening declined  - advised, declined     7. Screening mammogram, encounter for  - [facility] DI DIGITAL SCREEN BILATERAL; Future    8. COVID-19 vaccine series declined     9. Murmur  - stable         Return in about 3 months (around 10/5/2022) for AWV. SUBJECTIVE/OBJECTIVE:  HPI      DM  Checked her glucose   Med- Novolog- 7 units with meals, Lantus- 20 units HS   Last A1C was 5.0   No symptoms       Hypertension:    Home blood pressure monitoring: No.  She is adherent to a low sodium diet. Patient denies chest pain, shortness of breath, headache, lightheadedness, blurred vision, palpitations, dry cough and fatigue. Use of agents associated with hypertension: none. Hyperlipidemia:  No new myalgias or GI upset on none.        Hypothyroid- stable on current dose     Diabetes and Hypertension Visit Information    BP Readings from Last 3 Encounters:   22 126/67   09/10/21 128/67   21 128/76          Hemoglobin A1C (%)   Date Value   2021 5.7   2020 8.7     Microalbumin, Random Urine (mg/dL) Date Value   09/10/2021 2.30 (H)     LDL Calculated (mg/dL)   Date Value   09/10/2021 104     HDL (mg/dL)   Date Value   09/10/2021 25 (L)     BUN (mg/dL)   Date Value   06/09/2021 30     CREATININE (no units)   Date Value   06/09/2021 1.58     Glucose (mg/dL)   Date Value   06/09/2021 168        Patient Care Team:  PAULY Camarena as PCP - General (Physician Assistant)  PAULY Camarena as PCP - [facility] Provider         Medical History Review  Past Medical, Family, and Social History reviewed and does contribute to the patient presenting condition    Health Maintenance   Topic Date Due    Hepatitis A vaccine (1 of 2 - Risk 2-dose series) Never done    COVID-19 Vaccine (1) Never done    Diabetic foot exam  Never done    DTaP/Tdap/Td vaccine (1 - Tdap) Never done    Colorectal Cancer Screen  Never done    Breast cancer screen  Never done    Shingles vaccine (1 of 2) Never done    DEXA (modify frequency per FRAX score)  Never done    Pneumococcal 65+ years Vaccine (2 - PCV) 12/11/2011    Diabetic retinal exam  04/12/2022    Flu vaccine (1) 09/01/2022    Lipids  09/10/2022    Depression Screen  09/10/2022    Diabetic microalbuminuria test  04/24/2023    A1C test (Diabetic or Prediabetic)  04/25/2023    Hib vaccine  Aged Out    Meningococcal (ACWY) vaccine  Aged Out           Review of Systems   Constitutional: Negative. HENT: Negative. Respiratory: Negative. Cardiovascular: Negative. Gastrointestinal: Negative. Endocrine: Negative. Genitourinary: Negative. Neurological: Positive for headaches. Physical Exam  Vitals reviewed. Constitutional:       Appearance: Normal appearance. HENT:      Head: Normocephalic and atraumatic. Cardiovascular:      Rate and Rhythm: Normal rate and regular rhythm. Heart sounds: Murmur heard. Pulmonary:      Effort: Pulmonary effort is normal.      Breath sounds: Normal breath sounds.    Musculoskeletal: General: Normal range of motion. Right lower leg: Edema present. Left lower leg: Edema present. Skin:     General: Skin is warm. Neurological:      General: No focal deficit present. Mental Status: She is alert and oriented to person, place, and time. Vitals:    07/05/22 1326   BP: 126/67   Site: Left Upper Arm   Position: Sitting   Cuff Size: Large Adult   Pulse: 66   Weight: 208 lb (94.3 kg)   Height: 5' (1.524 m)                 An electronic signature was used to authenticate this note.     --PAULY Jaeger

## 2022-07-06 ENCOUNTER — TELEPHONE (OUTPATIENT)
Dept: INTERNAL MEDICINE | Age: 67
End: 2022-07-06

## 2022-07-06 DIAGNOSIS — Z79.4 DIABETES MELLITUS TYPE 2, INSULIN DEPENDENT (HCC): Primary | ICD-10-CM

## 2022-07-06 DIAGNOSIS — E11.9 DIABETES MELLITUS TYPE 2, INSULIN DEPENDENT (HCC): Primary | ICD-10-CM

## 2022-07-06 RX ORDER — INSULIN LISPRO 100 [IU]/ML
7 INJECTION, SOLUTION INTRAVENOUS; SUBCUTANEOUS
Qty: 5 PEN | Refills: 0 | Status: SHIPPED | OUTPATIENT
Start: 2022-07-06 | End: 2022-10-06 | Stop reason: SDUPTHER

## 2022-07-06 NOTE — TELEPHONE ENCOUNTER
Pt called stated her insurance will not cover the Donnise Crater but it will cover HUMALOG. She only has enough medication for one shot today. Can this please be switched. Her pharmacy has it in stock.     Thank you

## 2022-07-06 NOTE — TELEPHONE ENCOUNTER
I sent in 1 month supply of humalog so pt doesn't go without medication. Will forward to Ayo, so that she can address any refills.

## 2022-07-12 DIAGNOSIS — N18.32 STAGE 3B CHRONIC KIDNEY DISEASE (HCC): Primary | ICD-10-CM

## 2022-07-13 PROBLEM — Z28.21 COVID-19 VACCINATION DECLINED: Status: ACTIVE | Noted: 2022-07-05

## 2022-07-13 PROBLEM — N18.4 CKD (CHRONIC KIDNEY DISEASE) STAGE 4, GFR 15-29 ML/MIN (HCC): Status: ACTIVE | Noted: 2022-07-13

## 2022-09-25 DIAGNOSIS — G47.00 INSOMNIA, UNSPECIFIED TYPE: ICD-10-CM

## 2022-09-29 RX ORDER — DOXEPIN HYDROCHLORIDE 25 MG/1
25 CAPSULE ORAL NIGHTLY
Qty: 90 CAPSULE | Refills: 0 | Status: SHIPPED | OUTPATIENT
Start: 2022-09-29

## 2022-10-05 DIAGNOSIS — Z79.4 DIABETES MELLITUS TYPE 2, INSULIN DEPENDENT (HCC): ICD-10-CM

## 2022-10-05 DIAGNOSIS — E11.9 DIABETES MELLITUS TYPE 2, INSULIN DEPENDENT (HCC): ICD-10-CM

## 2022-10-05 NOTE — TELEPHONE ENCOUNTER
Comments:     Last Office Visit (last PCP visit):   7/5/2022    Next Visit Date:  No future appointments. **If hasn't been seen in over a year OR hasn't followed up according to last diabetes/ADHD visit, make appointment for patient before sending refill to provider.     Rx requested:  Requested Prescriptions     Pending Prescriptions Disp Refills    insulin lispro, 1 Unit Dial, (HUMALOG KWIKPEN) 100 UNIT/ML SOPN       Sig: Inject 7 Units into the skin 3 times daily (before meals)

## 2022-10-05 NOTE — TELEPHONE ENCOUNTER
Patient requesting refill for Humalog kwikpen to [facility]    Thank you

## 2022-10-06 RX ORDER — INSULIN LISPRO 100 [IU]/ML
7 INJECTION, SOLUTION INTRAVENOUS; SUBCUTANEOUS
Qty: 5 ADJUSTABLE DOSE PRE-FILLED PEN SYRINGE | Refills: 0 | Status: SHIPPED | OUTPATIENT
Start: 2022-10-06

## 2022-11-04 ENCOUNTER — TELEPHONE (OUTPATIENT)
Dept: INTERNAL MEDICINE | Age: 67
End: 2022-11-04

## 2022-11-04 NOTE — TELEPHONE ENCOUNTER
Attempt to reach patient for the mammogram order. Called patient @ [phone] and left message on machine for patient to return call during normal business hours of 8:30 AM and 5 PM @ [phone].

## 2022-11-10 ENCOUNTER — TELEPHONE (OUTPATIENT)
Dept: FAMILY MEDICINE CLINIC | Age: 67
End: 2022-11-10

## 2022-11-10 ENCOUNTER — TELEPHONE (OUTPATIENT)
Dept: INTERNAL MEDICINE | Age: 67
End: 2022-11-10

## 2022-12-22 ENCOUNTER — OFFICE VISIT (OUTPATIENT)
Dept: INTERNAL MEDICINE | Age: 67
End: 2022-12-22

## 2022-12-22 VITALS
BODY MASS INDEX: 43.23 KG/M2 | TEMPERATURE: 97.2 F | SYSTOLIC BLOOD PRESSURE: 124 MMHG | WEIGHT: 220.2 LBS | HEART RATE: 72 BPM | OXYGEN SATURATION: 99 % | RESPIRATION RATE: 16 BRPM | DIASTOLIC BLOOD PRESSURE: 74 MMHG | HEIGHT: 60 IN

## 2022-12-22 DIAGNOSIS — I10 ESSENTIAL HYPERTENSION: ICD-10-CM

## 2022-12-22 DIAGNOSIS — E03.9 ACQUIRED HYPOTHYROIDISM: ICD-10-CM

## 2022-12-22 DIAGNOSIS — N18.4 CKD (CHRONIC KIDNEY DISEASE) STAGE 4, GFR 15-29 ML/MIN (HCC): ICD-10-CM

## 2022-12-22 DIAGNOSIS — Z28.21 COVID-19 VACCINATION DECLINED: ICD-10-CM

## 2022-12-22 DIAGNOSIS — Z00.00 MEDICARE ANNUAL WELLNESS VISIT, SUBSEQUENT: Primary | ICD-10-CM

## 2022-12-22 DIAGNOSIS — Z79.4 DIABETES MELLITUS TYPE 2, INSULIN DEPENDENT (HCC): Primary | ICD-10-CM

## 2022-12-22 DIAGNOSIS — E11.9 DIABETES MELLITUS TYPE 2, INSULIN DEPENDENT (HCC): ICD-10-CM

## 2022-12-22 DIAGNOSIS — G47.00 INSOMNIA, UNSPECIFIED TYPE: ICD-10-CM

## 2022-12-22 DIAGNOSIS — E11.9 DIABETES MELLITUS TYPE 2, INSULIN DEPENDENT (HCC): Primary | ICD-10-CM

## 2022-12-22 DIAGNOSIS — Z23 NEED FOR INFLUENZA VACCINATION: ICD-10-CM

## 2022-12-22 DIAGNOSIS — Z79.4 DIABETES MELLITUS TYPE 2, INSULIN DEPENDENT (HCC): ICD-10-CM

## 2022-12-22 DIAGNOSIS — N18.32 STAGE 3B CHRONIC KIDNEY DISEASE (HCC): ICD-10-CM

## 2022-12-22 DIAGNOSIS — E55.9 VITAMIN D INSUFFICIENCY: ICD-10-CM

## 2022-12-22 LAB — HBA1C MFR BLD: 5 %

## 2022-12-22 RX ORDER — INSULIN GLARGINE 100 [IU]/ML
20 INJECTION, SOLUTION SUBCUTANEOUS NIGHTLY
Qty: 5 ADJUSTABLE DOSE PRE-FILLED PEN SYRINGE | Refills: 0 | Status: SHIPPED | OUTPATIENT
Start: 2022-12-22

## 2022-12-22 RX ORDER — DOXEPIN HYDROCHLORIDE 25 MG/1
25 CAPSULE ORAL NIGHTLY
Qty: 90 CAPSULE | Refills: 0 | Status: SHIPPED | OUTPATIENT
Start: 2022-12-22 | End: 2022-12-30

## 2022-12-22 RX ORDER — LISINOPRIL 20 MG/1
TABLET ORAL
Qty: 90 TABLET | Refills: 1 | Status: SHIPPED | OUTPATIENT
Start: 2022-12-22

## 2022-12-22 RX ORDER — TORSEMIDE 20 MG/1
20 TABLET ORAL DAILY
Qty: 90 TABLET | Refills: 1 | Status: SHIPPED | OUTPATIENT
Start: 2022-12-22

## 2022-12-22 RX ORDER — LEVOTHYROXINE SODIUM 0.12 MG/1
TABLET ORAL
Qty: 90 TABLET | Refills: 1 | Status: SHIPPED | OUTPATIENT
Start: 2022-12-22

## 2022-12-22 SDOH — ECONOMIC STABILITY: FOOD INSECURITY: WITHIN THE PAST 12 MONTHS, THE FOOD YOU BOUGHT JUST DIDN'T LAST AND YOU DIDN'T HAVE MONEY TO GET MORE.: NEVER TRUE

## 2022-12-22 SDOH — ECONOMIC STABILITY: FOOD INSECURITY: WITHIN THE PAST 12 MONTHS, YOU WORRIED THAT YOUR FOOD WOULD RUN OUT BEFORE YOU GOT MONEY TO BUY MORE.: NEVER TRUE

## 2022-12-22 ASSESSMENT — PATIENT HEALTH QUESTIONNAIRE - PHQ9
SUM OF ALL RESPONSES TO PHQ QUESTIONS 1-9: 0
SUM OF ALL RESPONSES TO PHQ9 QUESTIONS 1 & 2: 0
2. FEELING DOWN, DEPRESSED OR HOPELESS: 0
SUM OF ALL RESPONSES TO PHQ QUESTIONS 1-9: 0
SUM OF ALL RESPONSES TO PHQ QUESTIONS 1-9: 0
1. LITTLE INTEREST OR PLEASURE IN DOING THINGS: 0
SUM OF ALL RESPONSES TO PHQ QUESTIONS 1-9: 0

## 2022-12-22 ASSESSMENT — SOCIAL DETERMINANTS OF HEALTH (SDOH): HOW HARD IS IT FOR YOU TO PAY FOR THE VERY BASICS LIKE FOOD, HOUSING, MEDICAL CARE, AND HEATING?: NOT HARD AT ALL

## 2022-12-22 ASSESSMENT — LIFESTYLE VARIABLES
HOW OFTEN DO YOU HAVE A DRINK CONTAINING ALCOHOL: NEVER
HOW MANY STANDARD DRINKS CONTAINING ALCOHOL DO YOU HAVE ON A TYPICAL DAY: PATIENT DOES NOT DRINK

## 2022-12-22 NOTE — TELEPHONE ENCOUNTER
Comments:     Last Office Visit (last PCP visit):   2022    Next Visit Date:  No future appointments. **If hasn't been seen in over a year OR hasn't followed up according to last diabetes/ADHD visit, make appointment for patient before sending refill to provider.     Rx requested:  Requested Prescriptions     Pending Prescriptions Disp Refills    Insulin Pen Needle 32G X 6 MM MISC 2 each 3     Si Pen Needle by Does not apply route 4 times daily

## 2022-12-22 NOTE — PATIENT INSTRUCTIONS
Learning About Emotional Support  When do you need emotional support? You might find getting support from others helpful when you have a long-term health problem. Often people feel alone, confused, or scared when coping with an illness. But you aren't alone. Other people are going through the same thing you are and know how you feel. Talking with others about your feelings can help you feel better. Your family and friends can give you support. So can your doctor, a support group, or a Taoism. If you have a support network, you will not feel as alone. You will learn new ways to deal with your situation, and you may try harder to overcome it. Where you can get support  Family and friends: They can help you cope by giving you comfort and encouragement. Counseling: Professional counseling can help you cope with situations that interfere with your life and cause stress. Counseling can help you understand and deal with your illness. Your doctor: Find a doctor you trust and feel comfortable with. Be open and honest about your fears and concerns. Your doctor can help you get the right medical treatments, including counseling. Spiritual or Restorationism groups: They can provide comfort and may be able to help you find counseling or other social support services. Social groups: They can help you meet new people and get involved in activities you enjoy. Community support groups: In a support group, you can talk to others who have dealt with the same problems or illness as you. You can encourage one another and learn ways to cope with tough emotions. How to find a support group  Ask your doctor, counselor, or other health professional for suggestions. Contact your local Taoism, Sikhism, Baptism, or other Restorationism group. Ask your family and friends. Ask people who have the same health concerns. Go online. Forums and blogs let you read messages from others and leave your own messages.  You can exchange stories, vent your frustrations, and ask and answer questions. Contact a city, state, or national group that provides support for your health concerns. Your library or community center may have a list of these groups. Or you can look for information online. Look for a support group that works for you. Ask yourself if you prefer structure and would like a , or if you would like a less formal group. Do you prefer face-to-face meetings? Or do you feel more secure in online chat rooms or forums? Supportive relationships  A supportive relationship includes emotional support such as love, trust, and understanding, as well as advice and concrete help, such as help managing your time. Reach out to others  Family and friends can help you. Ask them to:  Listen to you and give you encouragement. This can keep you from feeling hopeless or alone. Help with small daily tasks or with bigger problems. A helping hand can keep you from feeling overwhelmed. Help you manage a health problem. For example, ask them to go to doctor visits with you. Your loved ones can offer support by being involved in your medical care. Respect your relationships  A good relationship is also a two-way street. You count on help from others, but they also count on you. Know your friends' limits. You don't have to see or call your friends every day. If you are going through a rough patch, ask friends if you can contact them outside of the usual boundaries. Don't always complain or talk about yourself. Know when it's time to stop talking and listen or just enjoy your friend's company. Know that good friends can be a bad influence. For example, if a friend encourages you to drink when you know it will harm you, you may want to end the friendship. Where can you learn more? Go to http://www.woods.com/ and enter G092 to learn more about \"Learning About Emotional Support. \"  Current as of: February 9, 3509               RVXRBOI ahead while they move a finger into and out of your field of vision. Color vision test   You look at pieces of printed test patterns in various colors. You say what number or symbol you see. Your doctor may have you trace the number or symbol using a pointer. How do these tests feel? There is very little chance of having a problem from this test. If dilating drops are used for a vision test, they may make the eyes sting and cause a medicine taste in the mouth. Follow-up care is a key part of your treatment and safety. Be sure to make and go to all appointments, and call your doctor if you are having problems. It's also a good idea to know your test results and keep a list of the medicines you take. Where can you learn more? Go to http://www.galeano.com/ and enter G551 to learn more about \"Learning About Vision Tests. \"  Current as of: October 12, 2022               Content Version: 13.5  © 2006-2022 [employer]. Care instructions adapted under license by [facility] ([facility] [place] [facility]). If you have questions about a medical condition or this instruction, always ask your healthcare professional. [place] [address] any warranty or liability for your use of this information. Advance Directives: Care Instructions  Overview  An advance directive is a legal way to state your wishes at the end of your life. It tells your family and your doctor what to do if you can't say what you want. There are two main types of advance directives. You can change them any time your wishes change. Living will. This form tells your family and your doctor your wishes about life support and other treatment. The form is also called a declaration. Medical power of . This form lets you name a person to make treatment decisions for you when you can't speak for yourself. This person is called a health care agent (health care proxy, health care surrogate).  The form is also called a durable power of  for health care. If you do not have an advance directive, decisions about your medical care may be made by a family member, or by a doctor or a  who doesn't know you. It may help to think of an advance directive as a gift to the people who care for you. If you have one, they won't have to make tough decisions by themselves. For more information, including forms for your state, see the [address] website (www.caringinfo.org/planning/advance-directives/). Follow-up care is a key part of your treatment and safety. Be sure to make and go to all appointments, and call your doctor if you are having problems. It's also a good idea to know your test results and keep a list of the medicines you take. What should you include in an advance directive? Many states have a unique advance directive form. (It may ask you to address specific issues.) Or you might use a universal form that's approved by many states. If your form doesn't tell you what to address, it may be hard to know what to include in your advance directive. Use the questions below to help you get started. Who do you want to make decisions about your medical care if you are not able to? What life-support measures do you want if you have a serious illness that gets worse over time or can't be cured? What are you most afraid of that might happen? (Maybe you're afraid of having pain, losing your independence, or being kept alive by machines.)  Where would you prefer to die? (Your home? A hospital? A nursing home?)  Do you want to donate your organs when you die? Do you want certain Restorationist practices performed before you die? When should you call for help? Be sure to contact your doctor if you have any questions. Where can you learn more? Go to http://www.bookjam.com/ and enter R264 to learn more about \"Advance Directives: Care Instructions. \"  Current as of: June 16, 2022               Content Version: 13.5  © 6660-0106 [employer]. Care instructions adapted under license by [facility] ([facility]). If you have questions about a medical condition or this instruction, always ask your healthcare professional. [address] any warranty or liability for your use of this information. Personalized Preventive Plan for Rupal Sherman - 12/22/2022  Medicare offers a range of preventive health benefits. Some of the tests and screenings are paid in full while other may be subject to a deductible, co-insurance, and/or copay. Some of these benefits include a comprehensive review of your medical history including lifestyle, illnesses that may run in your family, and various assessments and screenings as appropriate. After reviewing your medical record and screening and assessments performed today your provider may have ordered immunizations, labs, imaging, and/or referrals for you. A list of these orders (if applicable) as well as your Preventive Care list are included within your After Visit Summary for your review. Other Preventive Recommendations:    A preventive eye exam performed by an eye specialist is recommended every 1-2 years to screen for glaucoma; cataracts, macular degeneration, and other eye disorders. A preventive dental visit is recommended every 6 months. Try to get at least 150 minutes of exercise per week or 10,000 steps per day on a pedometer . Order or download the FREE \"Exercise & Physical Activity: Your Everyday Guide\" from [employer]. Call 8-728.565.9011 or search [employer] online. You need 7176-9376 mg of calcium and 5869-1076 IU of vitamin D per day. It is possible to meet your calcium requirement with diet alone, but a vitamin D supplement is usually necessary to meet this goal.  When exposed to the sun, use a sunscreen that protects against both UVA and UVB radiation with an SPF of 30 or greater.  Reapply every 2 to 3 hours or after sweating, drying off with a towel, or swimming. Always wear a seat belt when traveling in a car. Always wear a helmet when riding a bicycle or motorcycle.

## 2022-12-22 NOTE — PROGRESS NOTES
Vaccine Information Sheet, \"Influenza - Inactivated\"  given to Marlo Shanks, or parent/legal guardian of  Marlo Shanks and verbalized understanding. Patient responses:    Have you ever had a reaction to a flu vaccine? No  Are you able to eat eggs without adverse effects? No  Do you have any current illness? No  Have you ever had Guillian Hawks Syndrome? No    Flu vaccine given per order. Please see immunization tab.

## 2022-12-22 NOTE — PROGRESS NOTES
Medicare Annual Wellness Visit    Veronica Hernandez is here for Medicare AWV    Assessment & Plan   Medicare annual wellness visit, subsequent  -     Comprehensive Metabolic Panel; Future  -     CBC with Auto Differential; Future  Diabetes mellitus type 2, insulin dependent (HCC)  -     POCT glycosylated hemoglobin (Hb A1C)  -     Lipid Panel; Future  Insomnia, unspecified type  Acquired hypothyroidism  -     levothyroxine (SYNTHROID) 125 MCG tablet; take 1 tablet by mouth once daily, Disp-90 tablet, R-1Normal  -     TSH; Future  Essential hypertension  -     lisinopril (PRINIVIL;ZESTRIL) 20 MG tablet; take 1 tablet by mouth once daily, Disp-90 tablet, R-1Normal  Need for influenza vaccination  -     Influenza, FLUAD, (age 72 y+), IM, Preservative Free, 0.5 mL  Vitamin D insufficiency  -     vitamin D 25 MCG (1000 UT) CAPS; Take 1 capsule by mouth daily, Disp-90 capsule, R-1Normal  Stage 3b chronic kidney disease ([facility] [address]  COVID-19 vaccination declined  CKD (chronic kidney disease) stage 4, GFR 15-29 ml/min ([facility])    Recommendations for Preventive Services Due: see orders and patient instructions/AVS.  Recommended screening schedule for the next 5-10 years is provided to the patient in written form: see Patient Instructions/AVS.     Return for Medicare Annual Wellness Visit in 1 year. Subjective       Patient's complete Health Risk Assessment and screening values have been reviewed and are found in Flowsheets. The following problems were reviewed today and where indicated follow up appointments were made and/or referrals ordered.     Positive Risk Factor Screenings with Interventions:                 Weight and Activity:  Physical Activity: Insufficiently Active    Days of Exercise per Week: 3 days    Minutes of Exercise per Session: 20 min     On average, how many days per week do you engage in moderate to strenuous exercise (like a brisk walk)?: 3 days  Have you lost any weight without trying in the past 3 months?: No  Body mass index: (!) 43  Obesity Interventions:  Patient declines any further evaluation or treatment            Vision Screen:  Do you have difficulty driving, watching TV, or doing any of your daily activities because of your eyesight?: No  Have you had an eye exam within the past year?: (!) No  No results found. Interventions:   Patient encouraged to make appointment with their eye specialist                      Objective   Vitals:    12/22/22 1410   BP: 124/74   Site: Left Upper Arm   Position: Sitting   Cuff Size: Medium Adult   Pulse: 72   Resp: 16   Temp: 97.2 °F (36.2 °C)   SpO2: 99%   Weight: 220 lb 3.2 oz (99.9 kg)   Height: 5' (1.524 m)      Body mass index is 43 kg/m². Allergies   Allergen Reactions    Latex Itching    Morphine Other (See Comments)     Pt states that her breathing slowed and blood pressure dropped. Prior to Visit Medications    Medication Sig Taking?  Authorizing Provider   insulin glargine (LANTUS SOLOSTAR) 100 UNIT/ML injection pen Inject 20 Units into the skin nightly Yes PAULY Rivers   levothyroxine (SYNTHROID) 125 MCG tablet take 1 tablet by mouth once daily Yes PAULY Vasquez   lisinopril (PRINIVIL;ZESTRIL) 20 MG tablet take 1 tablet by mouth once daily Yes PAULY Rivers   torsemide (DEMADEX) 20 MG tablet Take 1 tablet by mouth daily Yes PAULY Rivers   vitamin D 25 MCG (1000 UT) CAPS Take 1 capsule by mouth daily Yes PAULY Rivers   BD INSULIN SYRINGE U/F 30G X 1/2\" 0.3 ML MISC use three times a day Yes PAULY Rivers   imatinib (GLEEVEC) 100 MG chemo tablet Take 200 mg by mouth daily  Yes Historical Provider, MD   doxepin (SINEQUAN) 25 MG capsule take 1 capsule by mouth nightly  PAULY Vasquez   Insulin Pen Needle 32G X 6 MM MISC 100 Pen Needle by Does not apply route 4 times daily  PAULY Vasquez   HUMALOG KWIKPEN 100 UNIT/ML SOPN inject 7 units subcutaneously three times a day before meals  Toby Magaña PAULY Gonzalez   ammonium lactate (LAC-HYDRIN) 12 % lotion APPLY TOPICALLY TO AFFECTED AREAS twice a day  Patient not taking: Reported on 12/22/2022  Historical Provider, MD   diclofenac sodium (VOLTAREN) 1 % GEL apply 4 grams to affected area four times a day if needed for heidi. ..  (REFER TO PRESCRIPTION NOTES).   Patient not taking: Reported on 12/22/2022  Historical Provider, MD Gilliam (Including outside providers/suppliers regularly involved in providing care):   Patient Care Team:  PAULY Live as PCP - General (Physician Assistant)  PAULY Live as PCP - [facility] Empaneled Provider     Reviewed and updated this visit:  Tobacco  Allergies  Meds  Problems  Med Hx  Surg Hx  Soc Hx  Fam Hx

## 2022-12-23 RX ORDER — INSULIN LISPRO 100 [IU]/ML
INJECTION, SOLUTION INTRAVENOUS; SUBCUTANEOUS
Qty: 15 ML | Refills: 1 | Status: SHIPPED | OUTPATIENT
Start: 2022-12-23

## 2022-12-23 NOTE — TELEPHONE ENCOUNTER
Comments:     Last Office Visit (last PCP visit):   12/22/2022    Next Visit Date:  No future appointments. **If hasn't been seen in over a year OR hasn't followed up according to last diabetes/ADHD visit, make appointment for patient before sending refill to provider.     Rx requested:  Requested Prescriptions     Pending Prescriptions Disp Refills    HUMALOG KWIKPEN 100 UNIT/ML SOPN [Pharmacy Med Name: HUMALOG 100 UNIT/ML KWIKPEN] 15 mL      Sig: inject 7 units subcutaneously three times a day before meals

## 2022-12-27 DIAGNOSIS — G47.00 INSOMNIA, UNSPECIFIED TYPE: ICD-10-CM

## 2022-12-27 NOTE — TELEPHONE ENCOUNTER
Comments:     Last Office Visit (last PCP visit):   12/22/2022    Next Visit Date:  No future appointments. **If hasn't been seen in over a year OR hasn't followed up according to last diabetes/ADHD visit, make appointment for patient before sending refill to provider.     Rx requested:  Requested Prescriptions     Pending Prescriptions Disp Refills    doxepin (SINEQUAN) 25 MG capsule [Pharmacy Med Name: DOXEPIN 25 MG CAPSULE] 90 capsule 0     Sig: take 1 capsule by mouth nightly

## 2022-12-30 RX ORDER — DOXEPIN HYDROCHLORIDE 25 MG/1
25 CAPSULE ORAL NIGHTLY
Qty: 90 CAPSULE | Refills: 0 | Status: SHIPPED | OUTPATIENT
Start: 2022-12-30

## 2023-02-22 DIAGNOSIS — E03.9 ACQUIRED HYPOTHYROIDISM: ICD-10-CM

## 2023-02-22 DIAGNOSIS — I10 ESSENTIAL HYPERTENSION: ICD-10-CM

## 2023-02-22 NOTE — TELEPHONE ENCOUNTER
Comments:     Last Office Visit (last PCP visit):   12/22/2022    Next Visit Date:  No future appointments. **If hasn't been seen in over a year OR hasn't followed up according to last diabetes/ADHD visit, make appointment for patient before sending refill to provider.     Rx requested:  Requested Prescriptions     Pending Prescriptions Disp Refills    insulin glargine (LANTUS SOLOSTAR) 100 UNIT/ML injection pen 5 Adjustable Dose Pre-filled Pen Syringe 0     Sig: Inject 20 Units into the skin nightly    levothyroxine (SYNTHROID) 125 MCG tablet 90 tablet 1     Sig: take 1 tablet by mouth once daily

## 2023-02-24 RX ORDER — LEVOTHYROXINE SODIUM 0.12 MG/1
TABLET ORAL
Qty: 90 TABLET | Refills: 1 | Status: SHIPPED | OUTPATIENT
Start: 2023-02-24

## 2023-02-24 RX ORDER — INSULIN GLARGINE 100 [IU]/ML
20 INJECTION, SOLUTION SUBCUTANEOUS NIGHTLY
Qty: 5 ADJUSTABLE DOSE PRE-FILLED PEN SYRINGE | Refills: 0 | Status: SHIPPED | OUTPATIENT
Start: 2023-02-24

## 2023-03-16 DIAGNOSIS — E11.9 DIABETES MELLITUS TYPE 2, INSULIN DEPENDENT (HCC): ICD-10-CM

## 2023-03-16 DIAGNOSIS — Z79.4 DIABETES MELLITUS TYPE 2, INSULIN DEPENDENT (HCC): ICD-10-CM

## 2023-03-17 RX ORDER — INSULIN LISPRO 100 [IU]/ML
INJECTION, SOLUTION INTRAVENOUS; SUBCUTANEOUS
Qty: 15 ML | Refills: 1 | Status: SHIPPED | OUTPATIENT
Start: 2023-03-17

## 2023-03-17 NOTE — TELEPHONE ENCOUNTER
Comments:     Last Office Visit (last PCP visit):   12/22/2022    Next Visit Date:  No future appointments. **If hasn't been seen in over a year OR hasn't followed up according to last diabetes/ADHD visit, make appointment for patient before sending refill to provider.     Rx requested:  Requested Prescriptions     Pending Prescriptions Disp Refills    insulin lispro, 1 Unit Dial, (HUMALOG KWIKPEN) 100 UNIT/ML SOPN 15 mL 1

## 2023-03-21 ENCOUNTER — APPOINTMENT (OUTPATIENT)
Dept: GENERAL RADIOLOGY | Age: 68
End: 2023-03-21
Payer: MEDICARE

## 2023-03-21 ENCOUNTER — APPOINTMENT (OUTPATIENT)
Dept: CT IMAGING | Age: 68
End: 2023-03-21
Payer: MEDICARE

## 2023-03-21 ENCOUNTER — HOSPITAL ENCOUNTER (EMERGENCY)
Age: 68
Discharge: HOME | End: 2023-03-21
Payer: MEDICARE

## 2023-03-21 VITALS
HEIGHT: 61 IN | TEMPERATURE: 98.5 F | BODY MASS INDEX: 33.99 KG/M2 | SYSTOLIC BLOOD PRESSURE: 150 MMHG | WEIGHT: 180 LBS | DIASTOLIC BLOOD PRESSURE: 59 MMHG | HEART RATE: 73 BPM | RESPIRATION RATE: 20 BRPM | OXYGEN SATURATION: 97 %

## 2023-03-21 DIAGNOSIS — S50.311A ABRASION OF RIGHT ELBOW, INITIAL ENCOUNTER: ICD-10-CM

## 2023-03-21 DIAGNOSIS — S00.83XA CONTUSION OF FACE, INITIAL ENCOUNTER: ICD-10-CM

## 2023-03-21 DIAGNOSIS — M25.521 RIGHT ELBOW PAIN: ICD-10-CM

## 2023-03-21 DIAGNOSIS — S09.90XA CLOSED HEAD INJURY, INITIAL ENCOUNTER: ICD-10-CM

## 2023-03-21 DIAGNOSIS — M79.641 RIGHT HAND PAIN: ICD-10-CM
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
## 2023-03-21 DIAGNOSIS — S00.81XA ABRASION OF FACE, INITIAL ENCOUNTER: ICD-10-CM

## 2023-03-21 PROCEDURE — 73130 X-RAY EXAM OF HAND: CPT

## 2023-03-21 PROCEDURE — 6370000000 HC RX 637 (ALT 250 FOR IP)

## 2023-03-21 PROCEDURE — 99284 EMERGENCY DEPT VISIT MOD MDM: CPT

## 2023-03-21 PROCEDURE — 73080 X-RAY EXAM OF ELBOW: CPT

## 2023-03-21 PROCEDURE — 70486 CT MAXILLOFACIAL W/O DYE: CPT

## 2023-03-21 PROCEDURE — 72125 CT NECK SPINE W/O DYE: CPT

## 2023-03-21 PROCEDURE — 73110 X-RAY EXAM OF WRIST: CPT | Performed by: RADIOLOGY

## 2023-03-21 PROCEDURE — 70450 CT HEAD/BRAIN W/O DYE: CPT

## 2023-03-21 RX ORDER — ACETAMINOPHEN 500 MG
1000 TABLET ORAL ONCE
Status: COMPLETED | OUTPATIENT
Start: 2023-03-21 | End: 2023-03-21

## 2023-03-21 RX ORDER — GINSENG 100 MG
CAPSULE ORAL ONCE
Status: COMPLETED | OUTPATIENT
Start: 2023-03-21 | End: 2023-03-21

## 2023-03-21 RX ORDER — BACITRACIN, NEOMYCIN, POLYMYXIN B 400; 3.5; 5 [USP'U]/G; MG/G; [USP'U]/G
OINTMENT TOPICAL
Qty: 1 G | Refills: 0 | Status: SHIPPED | OUTPATIENT
Start: 2023-03-21 | End: 2023-03-31

## 2023-03-21 RX ADMIN — ACETAMINOPHEN 1000 MG: 500 TABLET, FILM COATED ORAL at 14:27

## 2023-03-21 RX ADMIN — BACITRACIN: 500 OINTMENT TOPICAL at 14:39

## 2023-03-21 ASSESSMENT — PAIN - FUNCTIONAL ASSESSMENT
PAIN_FUNCTIONAL_ASSESSMENT: 0-10
PAIN_FUNCTIONAL_ASSESSMENT: NONE - DENIES PAIN

## 2023-03-21 ASSESSMENT — PAIN DESCRIPTION - ORIENTATION: ORIENTATION: RIGHT

## 2023-03-21 ASSESSMENT — ENCOUNTER SYMPTOMS
NAUSEA: 0
SHORTNESS OF BREATH: 0
EYE DISCHARGE: 0
CONSTIPATION: 0
EYE PAIN: 0
DIARRHEA: 0
ALLERGIC/IMMUNOLOGIC NEGATIVE: 1
EYE ITCHING: 0
COUGH: 0
ABDOMINAL PAIN: 0
VOMITING: 0

## 2023-03-21 ASSESSMENT — PAIN DESCRIPTION - DESCRIPTORS: DESCRIPTORS: ACHING

## 2023-03-21 ASSESSMENT — PAIN DESCRIPTION - FREQUENCY: FREQUENCY: CONTINUOUS

## 2023-03-21 ASSESSMENT — PAIN DESCRIPTION - PAIN TYPE: TYPE: ACUTE PAIN

## 2023-03-21 ASSESSMENT — PAIN SCALES - GENERAL: PAINLEVEL_OUTOF10: 7

## 2023-03-21 NOTE — ED PROVIDER NOTES
[facility] ED  eMERGENCY dEPARTMENT eNCOUnter      Pt Name: Uche Pelaez  MRN: [id number]  [place] 1955  Date of evaluation: 3/21/2023  Provider: REMINGTON Diop        HISTORY OF PRESENT ILLNESS    Uche Pelaez is a 75 y.o. female per chart review has a PMHx of stage IV CKD, type II DM, hypothyroidism, hypertension, vitamin D deficiency, hepatitis C presents to ED via EMS for evaluation following fall. Patient reports that she was taking her trash out and tripped on her outdoor steps and fell landing on concrete. Patient denies LOC. Patient denies being on any anticoagulation. Patient denies head, neck pain. Patient denies chest pain, shortness of breath, N/V/D, fever, chills. REVIEW OF SYSTEMS       Review of Systems   Constitutional:  Negative for activity change, appetite change, chills, fatigue and fever. HENT:  Negative for congestion. Eyes:  Negative for pain, discharge and itching. Respiratory:  Negative for cough and shortness of breath. Cardiovascular:  Negative for chest pain, palpitations and leg swelling. Gastrointestinal:  Negative for abdominal pain, constipation, diarrhea, nausea and vomiting. Endocrine: Negative for polydipsia, polyphagia and polyuria. Genitourinary:  Negative for difficulty urinating and dysuria. Musculoskeletal:  Positive for arthralgias. Negative for myalgias and neck pain. Skin:  Positive for wound. Negative for rash. Allergic/Immunologic: Negative. Neurological:  Negative for light-headedness and headaches. Hematological:  Negative for adenopathy. Does not bruise/bleed easily. Psychiatric/Behavioral: Negative. All other systems reviewed and are negative. Except as noted above the remainder of the review of systems was reviewed and negative.        PAST MEDICAL HISTORY     Past Medical History:   Diagnosis Date    Diabetes mellitus ([facility] [address]     Hypertension     Hypothyroidism     Type 2 diabetes mellitus without

## 2023-03-21 NOTE — ED TRIAGE NOTES
Patient presents to ED with c/o fall while at home after tripping on a step and landing on concrete.  She has multiple abrasions to right side of face/nose/right elbow and right hand

## 2023-05-09 RX ORDER — INSULIN GLARGINE 100 [IU]/ML
INJECTION, SOLUTION SUBCUTANEOUS
Qty: 15 ML | Refills: 0 | Status: SHIPPED | OUTPATIENT
Start: 2023-05-09

## 2023-05-30 ENCOUNTER — APPOINTMENT (OUTPATIENT)
Dept: GENERAL RADIOLOGY | Age: 68
End: 2023-05-30
Payer: MEDICARE

## 2023-05-30 ENCOUNTER — APPOINTMENT (OUTPATIENT)
Dept: CT IMAGING | Age: 68
End: 2023-05-30
Payer: MEDICARE

## 2023-05-30 ENCOUNTER — HOSPITAL ENCOUNTER (INPATIENT)
Age: 68
LOS: 3 days | Discharge: NURSING, REHAB OR LONG-TERM CARE | End: 2023-06-03
Attending: EMERGENCY MEDICINE | Admitting: INTERNAL MEDICINE
Payer: MEDICARE

## 2023-05-30 DIAGNOSIS — N39.0 ACUTE UTI: Primary | ICD-10-CM

## 2023-05-30 DIAGNOSIS — R53.81 PHYSICAL DECONDITIONING: ICD-10-CM

## 2023-05-30 DIAGNOSIS — S09.90XA CLOSED HEAD INJURY, INITIAL ENCOUNTER: ICD-10-CM
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
## 2023-05-30 PROBLEM — R27.0 ATAXIA: Status: ACTIVE | Noted: 2023-05-30

## 2023-05-30 LAB
ALBUMIN SERPL-MCNC: 3.3 G/DL (ref 3.5–4.6)
ALP SERPL-CCNC: 52 U/L (ref 40–130)
ALT SERPL-CCNC: 24 U/L (ref 0–33)
ANION GAP SERPL CALCULATED.3IONS-SCNC: 11 MEQ/L (ref 9–15)
AST SERPL-CCNC: 67 U/L (ref 0–35)
BACTERIA URNS QL MICRO: ABNORMAL /HPF
BASOPHILS # BLD: 0 K/UL (ref 0–0.1)
BASOPHILS NFR BLD: 0.6 % (ref 0.1–1.2)
BILIRUB SERPL-MCNC: 1.1 MG/DL (ref 0.2–0.7)
BILIRUB UR QL STRIP: NEGATIVE
BUN SERPL-MCNC: 48 MG/DL (ref 8–23)
CALCIUM SERPL-MCNC: 8.8 MG/DL (ref 8.5–9.9)
CHLORIDE SERPL-SCNC: 113 MEQ/L (ref 95–107)
CLARITY UR: ABNORMAL
CO2 SERPL-SCNC: 18 MEQ/L (ref 20–31)
COLOR UR: ABNORMAL
CREAT SERPL-MCNC: 1.91 MG/DL (ref 0.5–0.9)
EOSINOPHIL # BLD: 0 K/UL (ref 0–0.4)
EOSINOPHIL NFR BLD: 0.4 % (ref 0.7–5.8)
EPI CELLS #/AREA URNS HPF: ABNORMAL /HPF
ERYTHROCYTE [DISTWIDTH] IN BLOOD BY AUTOMATED COUNT: 16.2 % (ref 11.7–14.4)
GLOBULIN SER CALC-MCNC: 3.1 G/DL (ref 2.3–3.5)
GLUCOSE BLD-MCNC: 146 MG/DL (ref 70–99)
GLUCOSE SERPL-MCNC: 127 MG/DL (ref 70–99)
GLUCOSE UR STRIP-MCNC: 100 MG/DL
HCT VFR BLD AUTO: 28 % (ref 37–47)
HGB BLD-MCNC: 8.9 G/DL (ref 11.2–15.7)
HGB UR QL STRIP: ABNORMAL
IMM GRANULOCYTES # BLD: 0.1 K/UL
IMM GRANULOCYTES NFR BLD: 1.1 %
KETONES UR STRIP-MCNC: NEGATIVE MG/DL
LACTATE BLDV-SCNC: 2.6 MMOL/L (ref 0.5–2.2)
LACTATE BLDV-SCNC: 4.4 MMOL/L (ref 0.5–2.2)
LEUKOCYTE ESTERASE UR QL STRIP: NEGATIVE
LYMPHOCYTES # BLD: 0.6 K/UL (ref 1.2–3.7)
LYMPHOCYTES NFR BLD: 11 %
MAGNESIUM SERPL-MCNC: 2.1 MG/DL (ref 1.7–2.4)
MCH RBC QN AUTO: 33.7 PG (ref 25.6–32.2)
MCHC RBC AUTO-ENTMCNC: 31.8 % (ref 32.2–35.5)
MCV RBC AUTO: 106.1 FL (ref 79.4–94.8)
MONOCYTES # BLD: 0.3 K/UL (ref 0.2–0.9)
MONOCYTES NFR BLD: 5 % (ref 4.7–12.5)
NEUTROPHILS # BLD: 4.5 K/UL (ref 1.6–6.1)
NEUTS BAND NFR BLD MANUAL: 34 % (ref 5–11)
NEUTS SEG NFR BLD: 50 % (ref 34–71.1)
NITRITE UR QL STRIP: NEGATIVE
PERFORMED ON: ABNORMAL
PH UR STRIP: 5 [PH] (ref 5–9)
PLATELET # BLD AUTO: 31 K/UL (ref 182–369)
PLATELET BLD QL SMEAR: ABNORMAL
POTASSIUM SERPL-SCNC: 4.6 MEQ/L (ref 3.4–4.9)
PROT SERPL-MCNC: 6.4 G/DL (ref 6.3–8)
PROT UR STRIP-MCNC: 30 MG/DL
RBC # BLD AUTO: 2.64 M/UL (ref 3.93–5.22)
RBC #/AREA URNS HPF: ABNORMAL /HPF (ref 0–2)
SARS-COV-2 RDRP RESP QL NAA+PROBE: NOT DETECTED
SLIDE REVIEW: ABNORMAL
SODIUM SERPL-SCNC: 142 MEQ/L (ref 135–144)
SP GR UR STRIP: 1.02 (ref 1–1.03)
TOXIC GRANULATION: ABNORMAL
TROPONIN T SERPL-MCNC: 0.08 NG/ML (ref 0–0.01)
TROPONIN T SERPL-MCNC: 0.09 NG/ML (ref 0–0.01)
UROBILINOGEN UR STRIP-ACNC: 0.2 E.U./DL
WBC # BLD AUTO: 5.3 K/UL (ref 4–10)
WBC #/AREA URNS HPF: ABNORMAL /HPF (ref 0–5)

## 2023-05-30 PROCEDURE — 83605 ASSAY OF LACTIC ACID: CPT

## 2023-05-30 PROCEDURE — 6360000002 HC RX W HCPCS: Performed by: EMERGENCY MEDICINE

## 2023-05-30 PROCEDURE — 36415 COLL VENOUS BLD VENIPUNCTURE: CPT

## 2023-05-30 PROCEDURE — 72125 CT NECK SPINE W/O DYE: CPT

## 2023-05-30 PROCEDURE — 71045 X-RAY EXAM CHEST 1 VIEW: CPT

## 2023-05-30 PROCEDURE — 84484 ASSAY OF TROPONIN QUANT: CPT

## 2023-05-30 PROCEDURE — 87635 SARS-COV-2 COVID-19 AMP PRB: CPT

## 2023-05-30 PROCEDURE — 85025 COMPLETE CBC W/AUTO DIFF WBC: CPT

## 2023-05-30 PROCEDURE — 81001 URINALYSIS AUTO W/SCOPE: CPT

## 2023-05-30 PROCEDURE — 83735 ASSAY OF MAGNESIUM: CPT

## 2023-05-30 PROCEDURE — 99285 EMERGENCY DEPT VISIT HI MDM: CPT

## 2023-05-30 PROCEDURE — 87186 SC STD MICRODIL/AGAR DIL: CPT

## 2023-05-30 PROCEDURE — 70450 CT HEAD/BRAIN W/O DYE: CPT

## 2023-05-30 PROCEDURE — 87040 BLOOD CULTURE FOR BACTERIA: CPT

## 2023-05-30 PROCEDURE — 2580000003 HC RX 258: Performed by: EMERGENCY MEDICINE

## 2023-05-30 PROCEDURE — 93005 ELECTROCARDIOGRAM TRACING: CPT

## 2023-05-30 PROCEDURE — 80053 COMPREHEN METABOLIC PANEL: CPT

## 2023-05-30 PROCEDURE — 96365 THER/PROPH/DIAG IV INF INIT: CPT

## 2023-05-30 RX ORDER — 0.9 % SODIUM CHLORIDE 0.9 %
1000 INTRAVENOUS SOLUTION INTRAVENOUS ONCE
Status: COMPLETED | OUTPATIENT
Start: 2023-05-30 | End: 2023-05-31

## 2023-05-30 RX ADMIN — SODIUM CHLORIDE 1000 ML: 9 INJECTION, SOLUTION INTRAVENOUS at 21:11

## 2023-05-30 RX ADMIN — CEFTRIAXONE 1000 MG: 1 INJECTION, POWDER, FOR SOLUTION INTRAMUSCULAR; INTRAVENOUS at 21:57

## 2023-05-30 ASSESSMENT — ENCOUNTER SYMPTOMS
WHEEZING: 0
PHOTOPHOBIA: 0
BACK PAIN: 0
RHINORRHEA: 0
VOMITING: 0
COUGH: 0
SINUS PRESSURE: 0
APNEA: 0
COLOR CHANGE: 0
ABDOMINAL DISTENTION: 0
VOICE CHANGE: 0
CONSTIPATION: 0
SORE THROAT: 0
NAUSEA: 0
EYE PAIN: 0
SHORTNESS OF BREATH: 0
ABDOMINAL PAIN: 0
DIARRHEA: 0

## 2023-05-30 ASSESSMENT — PAIN - FUNCTIONAL ASSESSMENT: PAIN_FUNCTIONAL_ASSESSMENT: NONE - DENIES PAIN

## 2023-05-30 NOTE — ED TRIAGE NOTES
Patient lives alone and was by couch and had fallen at some point and hit head and left arm.  Pt denies pain at this time she does live lone

## 2023-05-31 ENCOUNTER — APPOINTMENT (OUTPATIENT)
Dept: GENERAL RADIOLOGY | Age: 68
End: 2023-05-31
Payer: MEDICARE

## 2023-05-31 PROBLEM — L03.90 CELLULITIS: Status: ACTIVE | Noted: 2023-05-31

## 2023-05-31 LAB
A BAUMANNII DNA BLD POS QL NAA+NON-PROBE: NOT DETECTED
ALBUMIN SERPL-MCNC: 3 G/DL (ref 3.5–4.6)
ALP SERPL-CCNC: 51 U/L (ref 40–130)
ALT SERPL-CCNC: 23 U/L (ref 0–33)
ANION GAP SERPL CALCULATED.3IONS-SCNC: 8 MEQ/L (ref 9–15)
AST SERPL-CCNC: 83 U/L (ref 0–35)
BASOPHILS # BLD: 0 K/UL (ref 0–0.1)
BASOPHILS NFR BLD: 1 % (ref 0.1–1.2)
BILIRUB SERPL-MCNC: 0.8 MG/DL (ref 0.2–0.7)
BUN SERPL-MCNC: 47 MG/DL (ref 8–23)
C ALBICANS DNA BLD POS QL NAA+NON-PROBE: NOT DETECTED
C AURIS DNA BLD POS QL NAA+PROBE: NOT DETECTED
C GLABRATA DNA BLD POS QL NAA+NON-PROBE: NOT DETECTED
C KRUSEI DNA BLD POS QL NAA+NON-PROBE: NOT DETECTED
C PARAP DNA BLD POS QL NAA+NON-PROBE: NOT DETECTED
C TROPICLS DNA BLD POS QL NAA+NON-PROBE: NOT DETECTED
CALCIUM SERPL-MCNC: 8.5 MG/DL (ref 8.5–9.9)
CHLORIDE SERPL-SCNC: 114 MEQ/L (ref 95–107)
CO2 SERPL-SCNC: 20 MEQ/L (ref 20–31)
CREAT SERPL-MCNC: 1.73 MG/DL (ref 0.5–0.9)
CRYPTOCOCCUS NEOFORMANS/GATTII BY PCR: NOT DETECTED
E CLOAC COMP DNA BLD POS NAA+NON-PROBE: NOT DETECTED
E COLI DNA BLD POS QL NAA+NON-PROBE: NOT DETECTED
E FAECALIS DNA BLD POS QL NAA+PROBE: NOT DETECTED
E FAECIUM DNA BLD POS QL NAA+PROBE: NOT DETECTED
ENTEROBACT DNA BLD POS QL NAA+NON-PROBE: NOT DETECTED
ENTEROCOC DNA BLD POS QL NAA+NON-PROBE: NOT DETECTED
EOSINOPHIL # BLD: 0 K/UL (ref 0–0.4)
EOSINOPHIL NFR BLD: 1 % (ref 0.7–5.8)
ERYTHROCYTE [DISTWIDTH] IN BLOOD BY AUTOMATED COUNT: 16.2 % (ref 11.7–14.4)
FERRITIN: 262 NG/ML (ref 13–150)
FOLATE: 14.1 NG/ML
GLOBULIN SER CALC-MCNC: 3.2 G/DL (ref 2.3–3.5)
GLUCOSE BLD-MCNC: 137 MG/DL (ref 70–99)
GLUCOSE BLD-MCNC: 143 MG/DL (ref 70–99)
GLUCOSE BLD-MCNC: 164 MG/DL (ref 70–99)
GLUCOSE BLD-MCNC: 200 MG/DL (ref 70–99)
GLUCOSE SERPL-MCNC: 128 MG/DL (ref 70–99)
GN BLD CULTURE PNL BLD POS NAA+PROBE: NOT DETECTED
GP B STREP DNA BLD POS QL NAA+NON-PROBE: NOT DETECTED
HBA1C MFR BLD: 4.4 % (ref 4.8–5.9)
HCT VFR BLD AUTO: 27.1 % (ref 37–47)
HGB BLD-MCNC: 8.5 G/DL (ref 11.2–15.7)
IMM GRANULOCYTES # BLD: 0.1 K/UL
IMM GRANULOCYTES NFR BLD: 1.6 %
IRON SATURATION: 14 % (ref 20–55)
IRON: 34 UG/DL (ref 37–145)
K OXYTOCA DNA BLD POS QL NAA+NON-PROBE: NOT DETECTED
K PNEUMON DNA SPEC QL NAA+PROBE: NOT DETECTED
K. AEROGENES DNA SPEC QL NAA+PROBE: NOT DETECTED
L MONOCYTOG DNA BLD POS QL NAA+NON-PROBE: NOT DETECTED
LACTATE BLDV-SCNC: 2.4 MMOL/L (ref 0.5–2.2)
LV EF: 60 %
LVEF MODALITY: NORMAL
LYMPHOCYTES # BLD: 0.7 K/UL (ref 1.2–3.7)
LYMPHOCYTES NFR BLD: 16 %
MACROCYTES BLD QL SMEAR: ABNORMAL
MCH RBC QN AUTO: 33.3 PG (ref 25.6–32.2)
MCHC RBC AUTO-ENTMCNC: 31.4 % (ref 32.2–35.5)
MCV RBC AUTO: 106.3 FL (ref 79.4–94.8)
MONOCYTES # BLD: 0.2 K/UL (ref 0.2–0.9)
MONOCYTES NFR BLD: 5 % (ref 4.7–12.5)
N MEN DNA BLD POS QL NAA+NON-PROBE: NOT DETECTED
NEUTROPHILS # BLD: 3.5 K/UL (ref 1.6–6.1)
NEUTS BAND NFR BLD MANUAL: 21 % (ref 5–11)
NEUTS SEG NFR BLD: 56 % (ref 34–71.1)
P AERUGINOSA DNA BLD POS NAA+NON-PROBE: NOT DETECTED
PERFORMED ON: ABNORMAL
PLATELET # BLD AUTO: 34 K/UL (ref 182–369)
PLATELET BLD QL SMEAR: ABNORMAL
POTASSIUM SERPL-SCNC: 4.9 MEQ/L (ref 3.4–4.9)
PROT SERPL-MCNC: 6.2 G/DL (ref 6.3–8)
PROTEUS SP DNA BLD POS QL NAA+NON-PROBE: NOT DETECTED
RBC # BLD AUTO: 2.55 M/UL (ref 3.93–5.22)
S AUREUS DNA BLD POS QL NAA+NON-PROBE: NOT DETECTED
S AUREUS+CONS DNA BLD POS NAA+NON-PROBE: DETECTED
S EPIDERMIDIS DNA BLD POS QL NAA+PROBE: NOT DETECTED
S LUGDUNENSIS DNA BLD POS QL NAA+PROBE: NOT DETECTED
S MALTOPH DNA BLD POS QL NAA+PROBE: NOT DETECTED
S MARCESCENS DNA BLD POS NAA+NON-PROBE: NOT DETECTED
S PNEUM DNA BLD POS QL NAA+NON-PROBE: NOT DETECTED
S PYO DNA BLD POS QL NAA+NON-PROBE: NOT DETECTED
SALMONELLA DNA BLD POS QL NAA+PROBE: NOT DETECTED
SLIDE REVIEW: ABNORMAL
SODIUM SERPL-SCNC: 142 MEQ/L (ref 135–144)
STREPTOCOCCUS DNA BLD POS NAA+NON-PROBE: NOT DETECTED
TOTAL IRON BINDING CAPACITY: 246 UG/DL (ref 250–450)
TROPONIN T SERPL-MCNC: 0.08 NG/ML (ref 0–0.01)
UNSATURATED IRON BINDING CAPACITY: 212 UG/DL (ref 112–347)
VITAMIN B-12: 1986 PG/ML (ref 232–1245)
WBC # BLD AUTO: 4.5 K/UL (ref 4–10)

## 2023-05-31 PROCEDURE — 6360000002 HC RX W HCPCS: Performed by: INTERNAL MEDICINE

## 2023-05-31 PROCEDURE — 84484 ASSAY OF TROPONIN QUANT: CPT

## 2023-05-31 PROCEDURE — 97162 PT EVAL MOD COMPLEX 30 MIN: CPT

## 2023-05-31 PROCEDURE — 1210000000 HC MED SURG R&B

## 2023-05-31 PROCEDURE — 82607 VITAMIN B-12: CPT

## 2023-05-31 PROCEDURE — 83540 ASSAY OF IRON: CPT

## 2023-05-31 PROCEDURE — 83605 ASSAY OF LACTIC ACID: CPT

## 2023-05-31 PROCEDURE — 6370000000 HC RX 637 (ALT 250 FOR IP): Performed by: INTERNAL MEDICINE

## 2023-05-31 PROCEDURE — 85025 COMPLETE CBC W/AUTO DIFF WBC: CPT

## 2023-05-31 PROCEDURE — 36415 COLL VENOUS BLD VENIPUNCTURE: CPT

## 2023-05-31 PROCEDURE — 80053 COMPREHEN METABOLIC PANEL: CPT

## 2023-05-31 PROCEDURE — 82728 ASSAY OF FERRITIN: CPT

## 2023-05-31 PROCEDURE — 83036 HEMOGLOBIN GLYCOSYLATED A1C: CPT

## 2023-05-31 PROCEDURE — 93306 TTE W/DOPPLER COMPLETE: CPT

## 2023-05-31 PROCEDURE — 97535 SELF CARE MNGMENT TRAINING: CPT

## 2023-05-31 PROCEDURE — 97530 THERAPEUTIC ACTIVITIES: CPT

## 2023-05-31 PROCEDURE — 73090 X-RAY EXAM OF FOREARM: CPT

## 2023-05-31 PROCEDURE — 2580000003 HC RX 258: Performed by: INTERNAL MEDICINE

## 2023-05-31 PROCEDURE — 97166 OT EVAL MOD COMPLEX 45 MIN: CPT

## 2023-05-31 PROCEDURE — 83550 IRON BINDING TEST: CPT

## 2023-05-31 PROCEDURE — 82746 ASSAY OF FOLIC ACID SERUM: CPT

## 2023-05-31 RX ORDER — SODIUM CHLORIDE 9 MG/ML
INJECTION, SOLUTION INTRAVENOUS PRN
Status: DISCONTINUED | OUTPATIENT
Start: 2023-05-31 | End: 2023-06-03 | Stop reason: HOSPADM

## 2023-05-31 RX ORDER — SODIUM CHLORIDE 0.9 % (FLUSH) 0.9 %
10 SYRINGE (ML) INJECTION PRN
Status: DISCONTINUED | OUTPATIENT
Start: 2023-05-31 | End: 2023-06-03 | Stop reason: HOSPADM

## 2023-05-31 RX ORDER — DEXTROSE MONOHYDRATE 100 MG/ML
INJECTION, SOLUTION INTRAVENOUS CONTINUOUS PRN
Status: DISCONTINUED | OUTPATIENT
Start: 2023-05-31 | End: 2023-06-03 | Stop reason: HOSPADM

## 2023-05-31 RX ORDER — ONDANSETRON 2 MG/ML
4 INJECTION INTRAMUSCULAR; INTRAVENOUS EVERY 6 HOURS PRN
Status: DISCONTINUED | OUTPATIENT
Start: 2023-05-31 | End: 2023-06-03 | Stop reason: HOSPADM

## 2023-05-31 RX ORDER — SODIUM CHLORIDE 9 MG/ML
INJECTION, SOLUTION INTRAVENOUS CONTINUOUS
Status: DISCONTINUED | OUTPATIENT
Start: 2023-05-31 | End: 2023-05-31

## 2023-05-31 RX ORDER — ACETAMINOPHEN 650 MG/1
650 SUPPOSITORY RECTAL EVERY 6 HOURS PRN
Status: DISCONTINUED | OUTPATIENT
Start: 2023-05-31 | End: 2023-06-03 | Stop reason: HOSPADM

## 2023-05-31 RX ORDER — SODIUM CHLORIDE 0.9 % (FLUSH) 0.9 %
10 SYRINGE (ML) INJECTION EVERY 12 HOURS SCHEDULED
Status: DISCONTINUED | OUTPATIENT
Start: 2023-05-31 | End: 2023-06-03 | Stop reason: HOSPADM

## 2023-05-31 RX ORDER — PANTOPRAZOLE SODIUM 40 MG/1
40 TABLET, DELAYED RELEASE ORAL
Status: DISCONTINUED | OUTPATIENT
Start: 2023-05-31 | End: 2023-06-03 | Stop reason: HOSPADM

## 2023-05-31 RX ORDER — INSULIN LISPRO 100 [IU]/ML
0-4 INJECTION, SOLUTION INTRAVENOUS; SUBCUTANEOUS NIGHTLY
Status: DISCONTINUED | OUTPATIENT
Start: 2023-05-31 | End: 2023-06-03 | Stop reason: HOSPADM

## 2023-05-31 RX ORDER — INSULIN LISPRO 100 [IU]/ML
0-8 INJECTION, SOLUTION INTRAVENOUS; SUBCUTANEOUS
Status: DISCONTINUED | OUTPATIENT
Start: 2023-05-31 | End: 2023-06-03 | Stop reason: HOSPADM

## 2023-05-31 RX ORDER — INSULIN GLARGINE 100 [IU]/ML
20 INJECTION, SOLUTION SUBCUTANEOUS NIGHTLY
Status: DISCONTINUED | OUTPATIENT
Start: 2023-05-31 | End: 2023-06-03 | Stop reason: HOSPADM

## 2023-05-31 RX ORDER — ACETAMINOPHEN 325 MG/1
650 TABLET ORAL EVERY 6 HOURS PRN
Status: DISCONTINUED | OUTPATIENT
Start: 2023-05-31 | End: 2023-06-03 | Stop reason: HOSPADM

## 2023-05-31 RX ORDER — POLYETHYLENE GLYCOL 3350 17 G/17G
17 POWDER, FOR SOLUTION ORAL DAILY PRN
Status: DISCONTINUED | OUTPATIENT
Start: 2023-05-31 | End: 2023-06-03 | Stop reason: HOSPADM

## 2023-05-31 RX ORDER — PROMETHAZINE HYDROCHLORIDE 12.5 MG/1
12.5 TABLET ORAL EVERY 6 HOURS PRN
Status: DISCONTINUED | OUTPATIENT
Start: 2023-05-31 | End: 2023-06-03 | Stop reason: HOSPADM

## 2023-05-31 RX ORDER — SODIUM CHLORIDE 9 MG/ML
INJECTION, SOLUTION INTRAVENOUS CONTINUOUS
Status: DISPENSED | OUTPATIENT
Start: 2023-05-31 | End: 2023-06-01

## 2023-05-31 RX ADMIN — INSULIN GLARGINE 20 UNITS: 100 INJECTION, SOLUTION SUBCUTANEOUS at 20:42

## 2023-05-31 RX ADMIN — LEVOTHYROXINE SODIUM 125 MCG: 0.1 TABLET ORAL at 05:49

## 2023-05-31 RX ADMIN — Medication 10 ML: at 08:46

## 2023-05-31 RX ADMIN — CEFTRIAXONE 1000 MG: 1 INJECTION, POWDER, FOR SOLUTION INTRAMUSCULAR; INTRAVENOUS at 22:13

## 2023-05-31 RX ADMIN — SODIUM CHLORIDE: 9 INJECTION, SOLUTION INTRAVENOUS at 02:00

## 2023-05-31 RX ADMIN — PANTOPRAZOLE SODIUM 40 MG: 40 TABLET, DELAYED RELEASE ORAL at 11:45

## 2023-05-31 RX ADMIN — INSULIN GLARGINE 20 UNITS: 100 INJECTION, SOLUTION SUBCUTANEOUS at 02:00

## 2023-05-31 NOTE — H&P
Hospital Medicine  History and Physical    Patient:  Shani Hassan  MRN: [id number]    CHIEF COMPLAINT:    Chief Complaint   Patient presents with    Fall       History Obtained From:  Patient, EMR  Primary Care Physician: PAULY Hernandez    HISTORY OF PRESENT ILLNESS:   The patient is a 75 y.o. female with PMH of CLL, hep C liver cirrhosis, obesity. Patient came down after she fell. Patient was found to have elevated lactic acid, TAMIKA, CKD. Patient also is found to have thrombocytopenia. Her platelets count few months ago at [facility] was in the 80s and now in the 30s. No hematemesis or melena. No focal weakness or numbness. No headaches or confusion. Past Medical History:      Diagnosis Date    Diabetes mellitus ([facility] [address]     Hypertension     Hypothyroidism     Type 2 diabetes mellitus without complication ([facility] [address]        Past Surgical History:      Procedure Laterality Date    CATARACT REMOVAL         Medications Prior to Admission:    Prior to Admission medications    Medication Sig Start Date End Date Taking?  Authorizing Provider   LANTUS SOLOSTAR 100 UNIT/ML injection pen inject 20 units subcutaneously nightly 5/9/23   PAULY Chao   insulin lispro, 1 Unit Dial, (HUMALOG KWIKPEN) 100 UNIT/ML SOPN inject 7 units subcutaneously three times a day before meals 3/17/23   PAULY Chao   levothyroxine (SYNTHROID) 125 MCG tablet take 1 tablet by mouth once daily 2/24/23   PAULY Chao   doxepin (SINEQUAN) 25 MG capsule take 1 capsule by mouth nightly 12/30/22   PAULY Chao   Insulin Pen Needle 32G X 6 MM MISC 100 Pen Needle by Does not apply route 4 times daily 12/23/22   PAULY Chao   lisinopril (PRINIVIL;ZESTRIL) 20 MG tablet take 1 tablet by mouth once daily 12/22/22   PAULY Chao   torsemide (DEMADEX) 20 MG tablet Take 1 tablet by mouth daily 12/22/22   PAULY Chao   vitamin D 25 MCG (1000 UT) CAPS Take 1 capsule by mouth daily 12/22/22   Mirella Marrero

## 2023-05-31 NOTE — ED NOTES
Nursing Supervisor notified that patient was accepted by Dr Julianne Hatch for admission.      Ness Krishnan RN  05/30/23 0330

## 2023-05-31 NOTE — ED NOTES
Dr Sharan Funk returned phone call and speaking with Dr Yari Santoyo at this time.      Jerry Toledo RN  05/31/23 [id number]

## 2023-05-31 NOTE — ED PROVIDER NOTES
[facility]  eMERGENCY dEPARTMENT eNCOUnter      Pt Name: Ronal Iverson  MRN: [id number]  [place] 1955  Date of evaluation: 5/30/2023  Provider: Christa Duval MD    CHIEF COMPLAINT       Chief Complaint   Patient presents with    Fall         HISTORY OF PRESENT ILLNESS   (Location/Symptom, Timing/Onset,Context/Setting, Quality, Duration, Modifying Factors, Severity)  Note limiting factors. Ronal Iverson is a 75 y.o. female who presents to the emergency department with complaint of head injury status post fall while cleaning toilet at home this morning. Patient presented accompanied by niece who is worried that she is generally deconditioned. She denies loss of consciousness with the fall. Multiple issues with falls. History of leukemia. Other comorbid conditions includes severe obesity, chronic myeloid leukemia, stage IV kidney failure, diabetes mellitus, hypothyroidism, hypertension, vitamin D insufficiency, chronic hepatitis C. Denies any pain. HPI    Nursing Notes were reviewed. REVIEW OF SYSTEMS    (2-9 systems for level 4, 10 or more for level 5)     Review of Systems   Constitutional:  Positive for activity change and fatigue. Negative for appetite change, chills and fever. HENT:  Negative for congestion, ear discharge, ear pain, hearing loss, rhinorrhea, sinus pressure, sore throat and voice change. Eyes:  Negative for photophobia, pain and visual disturbance. Respiratory:  Negative for apnea, cough, shortness of breath and wheezing. Cardiovascular:  Positive for leg swelling. Negative for chest pain and palpitations. Gastrointestinal:  Negative for abdominal distention, abdominal pain, constipation, diarrhea, nausea and vomiting. Endocrine: Negative for cold intolerance, heat intolerance and polyuria. Genitourinary:  Negative for dysuria, flank pain, frequency and urgency. Musculoskeletal:  Positive for arthralgias.  Negative for back pain, gait problem, myalgias

## 2023-06-01 LAB
ANION GAP SERPL CALCULATED.3IONS-SCNC: 7 MEQ/L (ref 9–15)
BACTERIA BLD CULT ORG #2: ABNORMAL
BACTERIA BLD CULT: ABNORMAL
BASOPHILS # BLD: 0 K/UL (ref 0–0.1)
BASOPHILS NFR BLD: 1.6 % (ref 0.1–1.2)
BUN SERPL-MCNC: 41 MG/DL (ref 8–23)
CALCIUM SERPL-MCNC: 7.4 MG/DL (ref 8.5–9.9)
CHLORIDE SERPL-SCNC: 118 MEQ/L (ref 95–107)
CO2 SERPL-SCNC: 19 MEQ/L (ref 20–31)
CREAT SERPL-MCNC: 1.41 MG/DL (ref 0.5–0.9)
EKG ATRIAL RATE: 88 BPM
EKG ATRIAL RATE: 88 BPM
EKG P AXIS: 44 DEGREES
EKG P AXIS: 55 DEGREES
EKG P-R INTERVAL: 144 MS
EKG P-R INTERVAL: 146 MS
EKG Q-T INTERVAL: 368 MS
EKG Q-T INTERVAL: 384 MS
EKG QRS DURATION: 84 MS
EKG QRS DURATION: 88 MS
EKG QTC CALCULATION (BAZETT): 445 MS
EKG QTC CALCULATION (BAZETT): 464 MS
EKG R AXIS: 1 DEGREES
EKG R AXIS: 12 DEGREES
EKG T AXIS: 96 DEGREES
EKG T AXIS: 98 DEGREES
EKG VENTRICULAR RATE: 88 BPM
EKG VENTRICULAR RATE: 88 BPM
EOSINOPHIL # BLD: 0.1 K/UL (ref 0–0.4)
EOSINOPHIL NFR BLD: 4.7 % (ref 0.7–5.8)
ERYTHROCYTE [DISTWIDTH] IN BLOOD BY AUTOMATED COUNT: 16.2 % (ref 11.7–14.4)
GLUCOSE BLD-MCNC: 132 MG/DL (ref 70–99)
GLUCOSE BLD-MCNC: 154 MG/DL (ref 70–99)
GLUCOSE BLD-MCNC: 167 MG/DL (ref 70–99)
GLUCOSE BLD-MCNC: 189 MG/DL (ref 70–99)
GLUCOSE SERPL-MCNC: 131 MG/DL (ref 70–99)
HCT VFR BLD AUTO: 24.6 % (ref 37–47)
HGB BLD-MCNC: 7.8 G/DL (ref 11.2–15.7)
IMM GRANULOCYTES # BLD: 0 K/UL
IMM GRANULOCYTES NFR BLD: 1.6 %
LYMPHOCYTES # BLD: 0.5 K/UL (ref 1.2–3.7)
LYMPHOCYTES NFR BLD: 21 %
MCH RBC QN AUTO: 33.5 PG (ref 25.6–32.2)
MCHC RBC AUTO-ENTMCNC: 31.7 % (ref 32.2–35.5)
MCV RBC AUTO: 105.6 FL (ref 79.4–94.8)
MONOCYTES # BLD: 0.2 K/UL (ref 0.2–0.9)
MONOCYTES NFR BLD: 7.4 % (ref 4.7–12.5)
NEUTROPHILS # BLD: 1.6 K/UL (ref 1.6–6.1)
NEUTS SEG NFR BLD: 63.7 % (ref 34–71.1)
PERFORMED ON: ABNORMAL
PLATELET # BLD AUTO: 32 K/UL (ref 182–369)
PLATELET BLD QL SMEAR: ABNORMAL
POTASSIUM SERPL-SCNC: 4.4 MEQ/L (ref 3.4–4.9)
RBC # BLD AUTO: 2.33 M/UL (ref 3.93–5.22)
SLIDE REVIEW: ABNORMAL
SODIUM SERPL-SCNC: 144 MEQ/L (ref 135–144)
WBC # BLD AUTO: 2.6 K/UL (ref 4–10)

## 2023-06-01 PROCEDURE — 93010 ELECTROCARDIOGRAM REPORT: CPT | Performed by: INTERNAL MEDICINE

## 2023-06-01 PROCEDURE — 6370000000 HC RX 637 (ALT 250 FOR IP): Performed by: INTERNAL MEDICINE

## 2023-06-01 PROCEDURE — 97110 THERAPEUTIC EXERCISES: CPT

## 2023-06-01 PROCEDURE — 87150 DNA/RNA AMPLIFIED PROBE: CPT

## 2023-06-01 PROCEDURE — 97116 GAIT TRAINING THERAPY: CPT

## 2023-06-01 PROCEDURE — 97530 THERAPEUTIC ACTIVITIES: CPT

## 2023-06-01 PROCEDURE — 85025 COMPLETE CBC W/AUTO DIFF WBC: CPT

## 2023-06-01 PROCEDURE — 2580000003 HC RX 258: Performed by: INTERNAL MEDICINE

## 2023-06-01 PROCEDURE — 80048 BASIC METABOLIC PNL TOTAL CA: CPT

## 2023-06-01 PROCEDURE — 1210000000 HC MED SURG R&B

## 2023-06-01 PROCEDURE — 6360000002 HC RX W HCPCS: Performed by: INTERNAL MEDICINE

## 2023-06-01 PROCEDURE — 87077 CULTURE AEROBIC IDENTIFY: CPT

## 2023-06-01 PROCEDURE — 36415 COLL VENOUS BLD VENIPUNCTURE: CPT

## 2023-06-01 PROCEDURE — 86403 PARTICLE AGGLUT ANTBDY SCRN: CPT

## 2023-06-01 RX ORDER — IBUPROFEN 400 MG/1
400 TABLET ORAL ONCE
Status: DISCONTINUED | OUTPATIENT
Start: 2023-06-01 | End: 2023-06-03 | Stop reason: HOSPADM

## 2023-06-01 RX ADMIN — PANTOPRAZOLE SODIUM 40 MG: 40 TABLET, DELAYED RELEASE ORAL at 05:21

## 2023-06-01 RX ADMIN — LEVOTHYROXINE SODIUM 125 MCG: 0.1 TABLET ORAL at 05:21

## 2023-06-01 RX ADMIN — INSULIN GLARGINE 20 UNITS: 100 INJECTION, SOLUTION SUBCUTANEOUS at 20:27

## 2023-06-01 RX ADMIN — Medication 10 ML: at 09:45

## 2023-06-01 RX ADMIN — VANCOMYCIN HYDROCHLORIDE 1000 MG: 1 INJECTION, POWDER, LYOPHILIZED, FOR SOLUTION INTRAVENOUS at 09:43

## 2023-06-01 RX ADMIN — Medication 10 ML: at 20:28

## 2023-06-01 RX ADMIN — SODIUM CHLORIDE: 9 INJECTION, SOLUTION INTRAVENOUS at 09:46

## 2023-06-01 NOTE — CARE COORDINATION
Pre certification for [facility] [place] [place] admission was obtained from [facility] this morning. Reference #: [id number].

## 2023-06-02 ENCOUNTER — APPOINTMENT (OUTPATIENT)
Dept: ULTRASOUND IMAGING | Age: 68
End: 2023-06-02
Payer: MEDICARE

## 2023-06-02 LAB
ANION GAP SERPL CALCULATED.3IONS-SCNC: 8 MEQ/L (ref 9–15)
BASOPHILS # BLD: 0.1 K/UL (ref 0–0.1)
BASOPHILS NFR BLD: 2.2 % (ref 0.1–1.2)
BUN SERPL-MCNC: 34 MG/DL (ref 8–23)
CALCIUM SERPL-MCNC: 7.7 MG/DL (ref 8.5–9.9)
CHLORIDE SERPL-SCNC: 117 MEQ/L (ref 95–107)
CO2 SERPL-SCNC: 20 MEQ/L (ref 20–31)
CREAT SERPL-MCNC: 1.23 MG/DL (ref 0.5–0.9)
EOSINOPHIL # BLD: 0.2 K/UL (ref 0–0.4)
EOSINOPHIL NFR BLD: 6.5 % (ref 0.7–5.8)
ERYTHROCYTE [DISTWIDTH] IN BLOOD BY AUTOMATED COUNT: 16.4 % (ref 11.7–14.4)
GLUCOSE BLD-MCNC: 119 MG/DL (ref 70–99)
GLUCOSE BLD-MCNC: 123 MG/DL (ref 70–99)
GLUCOSE BLD-MCNC: 132 MG/DL (ref 70–99)
GLUCOSE BLD-MCNC: 183 MG/DL (ref 70–99)
GLUCOSE SERPL-MCNC: 127 MG/DL (ref 70–99)
HCT VFR BLD AUTO: 25.1 % (ref 37–47)
HGB BLD-MCNC: 7.8 G/DL (ref 11.2–15.7)
IMM GRANULOCYTES # BLD: 0.1 K/UL
IMM GRANULOCYTES NFR BLD: 3.9 %
LYMPHOCYTES # BLD: 0.6 K/UL (ref 1.2–3.7)
LYMPHOCYTES NFR BLD: 24.1 %
MCH RBC QN AUTO: 32.9 PG (ref 25.6–32.2)
MCHC RBC AUTO-ENTMCNC: 31.1 % (ref 32.2–35.5)
MCV RBC AUTO: 105.9 FL (ref 79.4–94.8)
MONOCYTES # BLD: 0.2 K/UL (ref 0.2–0.9)
MONOCYTES NFR BLD: 9.9 % (ref 4.7–12.5)
NEUTROPHILS # BLD: 1.2 K/UL (ref 1.6–6.1)
NEUTS SEG NFR BLD: 53.4 % (ref 34–71.1)
PERFORMED ON: ABNORMAL
PLATELET # BLD AUTO: 34 K/UL (ref 182–369)
PLATELET BLD QL SMEAR: ABNORMAL
POTASSIUM SERPL-SCNC: 4.2 MEQ/L (ref 3.4–4.9)
RBC # BLD AUTO: 2.37 M/UL (ref 3.93–5.22)
SLIDE REVIEW: ABNORMAL
SODIUM SERPL-SCNC: 145 MEQ/L (ref 135–144)
VANCOMYCIN TROUGH SERPL-MCNC: 5.3 UG/ML (ref 10–20)
WBC # BLD AUTO: 2.3 K/UL (ref 4–10)

## 2023-06-02 PROCEDURE — 2580000003 HC RX 258: Performed by: INTERNAL MEDICINE

## 2023-06-02 PROCEDURE — 1210000000 HC MED SURG R&B

## 2023-06-02 PROCEDURE — 97530 THERAPEUTIC ACTIVITIES: CPT

## 2023-06-02 PROCEDURE — 6360000002 HC RX W HCPCS: Performed by: INTERNAL MEDICINE

## 2023-06-02 PROCEDURE — 97110 THERAPEUTIC EXERCISES: CPT

## 2023-06-02 PROCEDURE — 93970 EXTREMITY STUDY: CPT

## 2023-06-02 PROCEDURE — 6370000000 HC RX 637 (ALT 250 FOR IP): Performed by: INTERNAL MEDICINE

## 2023-06-02 PROCEDURE — 85025 COMPLETE CBC W/AUTO DIFF WBC: CPT

## 2023-06-02 PROCEDURE — 36415 COLL VENOUS BLD VENIPUNCTURE: CPT

## 2023-06-02 PROCEDURE — 80048 BASIC METABOLIC PNL TOTAL CA: CPT

## 2023-06-02 PROCEDURE — 97116 GAIT TRAINING THERAPY: CPT

## 2023-06-02 PROCEDURE — 97535 SELF CARE MNGMENT TRAINING: CPT

## 2023-06-02 PROCEDURE — 80202 ASSAY OF VANCOMYCIN: CPT

## 2023-06-02 RX ORDER — LOPERAMIDE HYDROCHLORIDE 2 MG/1
2 TABLET ORAL ONCE
Status: DISCONTINUED | OUTPATIENT
Start: 2023-06-02 | End: 2023-06-02 | Stop reason: ALTCHOICE

## 2023-06-02 RX ORDER — LOPERAMIDE HYDROCHLORIDE 2 MG/1
2 CAPSULE ORAL ONCE
Status: COMPLETED | OUTPATIENT
Start: 2023-06-02 | End: 2023-06-02

## 2023-06-02 RX ADMIN — INSULIN GLARGINE 20 UNITS: 100 INJECTION, SOLUTION SUBCUTANEOUS at 20:31

## 2023-06-02 RX ADMIN — IRON SUCROSE 200 MG: 20 INJECTION, SOLUTION INTRAVENOUS at 10:09

## 2023-06-02 RX ADMIN — LOPERAMIDE HYDROCHLORIDE 2 MG: 2 CAPSULE ORAL at 20:30

## 2023-06-02 RX ADMIN — PANTOPRAZOLE SODIUM 40 MG: 40 TABLET, DELAYED RELEASE ORAL at 05:47

## 2023-06-02 RX ADMIN — VANCOMYCIN HYDROCHLORIDE 1000 MG: 1 INJECTION, POWDER, LYOPHILIZED, FOR SOLUTION INTRAVENOUS at 08:28

## 2023-06-02 RX ADMIN — Medication 10 ML: at 20:30

## 2023-06-02 RX ADMIN — LEVOTHYROXINE SODIUM 125 MCG: 0.1 TABLET ORAL at 05:47

## 2023-06-02 RX ADMIN — Medication 10 ML: at 08:29

## 2023-06-02 ASSESSMENT — PAIN SCALES - GENERAL: PAINLEVEL_OUTOF10: 0

## 2023-06-03 ENCOUNTER — HOSPITAL ENCOUNTER (INPATIENT)
Age: 68
LOS: 7 days | Discharge: HOME HEALTH | End: 2023-06-10
Attending: INTERNAL MEDICINE | Admitting: INTERNAL MEDICINE
Payer: MEDICARE

## 2023-06-03 VITALS
BODY MASS INDEX: 42.1 KG/M2 | WEIGHT: 223 LBS | OXYGEN SATURATION: 95 % | DIASTOLIC BLOOD PRESSURE: 32 MMHG | TEMPERATURE: 98.6 F | SYSTOLIC BLOOD PRESSURE: 129 MMHG | RESPIRATION RATE: 18 BRPM | HEIGHT: 61 IN | HEART RATE: 74 BPM

## 2023-06-03 DIAGNOSIS — L03.119 CELLULITIS OF LOWER EXTREMITY, UNSPECIFIED LATERALITY: Primary | ICD-10-CM

## 2023-06-03 DIAGNOSIS — R27.0 ATAXIA: ICD-10-CM

## 2023-06-03 LAB
CULTURE, BLOOD ID SENSITIVITY: ABNORMAL
CULTURE, BLOOD ID SENSITIVITY: ABNORMAL
GLUCOSE BLD-MCNC: 103 MG/DL (ref 70–99)
GLUCOSE BLD-MCNC: 195 MG/DL (ref 70–99)
GLUCOSE BLD-MCNC: 208 MG/DL (ref 70–99)
GLUCOSE BLD-MCNC: 92 MG/DL (ref 70–99)
ORGANISM: ABNORMAL
ORGANISM: ABNORMAL
PERFORMED ON: ABNORMAL
PERFORMED ON: NORMAL

## 2023-06-03 PROCEDURE — 2580000003 HC RX 258: Performed by: INTERNAL MEDICINE

## 2023-06-03 PROCEDURE — 97535 SELF CARE MNGMENT TRAINING: CPT

## 2023-06-03 PROCEDURE — 6360000002 HC RX W HCPCS: Performed by: INTERNAL MEDICINE

## 2023-06-03 PROCEDURE — 6370000000 HC RX 637 (ALT 250 FOR IP): Performed by: INTERNAL MEDICINE

## 2023-06-03 PROCEDURE — 97110 THERAPEUTIC EXERCISES: CPT

## 2023-06-03 PROCEDURE — 97116 GAIT TRAINING THERAPY: CPT

## 2023-06-03 PROCEDURE — 1200000002 HC SEMI PRIVATE SWING BED

## 2023-06-03 PROCEDURE — 97530 THERAPEUTIC ACTIVITIES: CPT

## 2023-06-03 PROCEDURE — 87040 BLOOD CULTURE FOR BACTERIA: CPT

## 2023-06-03 PROCEDURE — 36415 COLL VENOUS BLD VENIPUNCTURE: CPT

## 2023-06-03 RX ORDER — POLYETHYLENE GLYCOL 3350 17 G/17G
17 POWDER, FOR SOLUTION ORAL DAILY PRN
Status: CANCELLED | OUTPATIENT
Start: 2023-06-03

## 2023-06-03 RX ORDER — ACETAMINOPHEN 650 MG/1
650 SUPPOSITORY RECTAL EVERY 6 HOURS PRN
Status: CANCELLED | OUTPATIENT
Start: 2023-06-03

## 2023-06-03 RX ORDER — PROMETHAZINE HYDROCHLORIDE 12.5 MG/1
12.5 TABLET ORAL EVERY 6 HOURS PRN
Status: DISCONTINUED | OUTPATIENT
Start: 2023-06-03 | End: 2023-06-10 | Stop reason: HOSPADM

## 2023-06-03 RX ORDER — SODIUM CHLORIDE 0.9 % (FLUSH) 0.9 %
10 SYRINGE (ML) INJECTION PRN
Status: DISCONTINUED | OUTPATIENT
Start: 2023-06-03 | End: 2023-06-09

## 2023-06-03 RX ORDER — DEXTROSE MONOHYDRATE 100 MG/ML
INJECTION, SOLUTION INTRAVENOUS CONTINUOUS PRN
Status: DISCONTINUED | OUTPATIENT
Start: 2023-06-03 | End: 2023-06-10 | Stop reason: HOSPADM

## 2023-06-03 RX ORDER — FERROUS SULFATE 325(65) MG
325 TABLET ORAL 2 TIMES DAILY WITH MEALS
Status: CANCELLED | OUTPATIENT
Start: 2023-06-05

## 2023-06-03 RX ORDER — ACETAMINOPHEN 325 MG/1
650 TABLET ORAL EVERY 6 HOURS PRN
Status: DISCONTINUED | OUTPATIENT
Start: 2023-06-03 | End: 2023-06-10 | Stop reason: HOSPADM

## 2023-06-03 RX ORDER — ACETAMINOPHEN 650 MG/1
650 SUPPOSITORY RECTAL EVERY 6 HOURS PRN
Status: DISCONTINUED | OUTPATIENT
Start: 2023-06-03 | End: 2023-06-10 | Stop reason: HOSPADM

## 2023-06-03 RX ORDER — SODIUM CHLORIDE 0.9 % (FLUSH) 0.9 %
10 SYRINGE (ML) INJECTION EVERY 12 HOURS SCHEDULED
Status: CANCELLED | OUTPATIENT
Start: 2023-06-03

## 2023-06-03 RX ORDER — INSULIN LISPRO 100 [IU]/ML
0-4 INJECTION, SOLUTION INTRAVENOUS; SUBCUTANEOUS NIGHTLY
Status: CANCELLED | OUTPATIENT
Start: 2023-06-03

## 2023-06-03 RX ORDER — SODIUM CHLORIDE 0.9 % (FLUSH) 0.9 %
10 SYRINGE (ML) INJECTION EVERY 12 HOURS SCHEDULED
Status: DISCONTINUED | OUTPATIENT
Start: 2023-06-03 | End: 2023-06-09

## 2023-06-03 RX ORDER — INSULIN LISPRO 100 [IU]/ML
0-8 INJECTION, SOLUTION INTRAVENOUS; SUBCUTANEOUS
Status: DISCONTINUED | OUTPATIENT
Start: 2023-06-03 | End: 2023-06-10 | Stop reason: HOSPADM

## 2023-06-03 RX ORDER — SODIUM CHLORIDE 9 MG/ML
INJECTION, SOLUTION INTRAVENOUS PRN
Status: DISCONTINUED | OUTPATIENT
Start: 2023-06-03 | End: 2023-06-10 | Stop reason: HOSPADM

## 2023-06-03 RX ORDER — INSULIN LISPRO 100 [IU]/ML
0-4 INJECTION, SOLUTION INTRAVENOUS; SUBCUTANEOUS NIGHTLY
Status: DISCONTINUED | OUTPATIENT
Start: 2023-06-03 | End: 2023-06-10 | Stop reason: HOSPADM

## 2023-06-03 RX ORDER — INSULIN LISPRO 100 [IU]/ML
0-8 INJECTION, SOLUTION INTRAVENOUS; SUBCUTANEOUS
Status: CANCELLED | OUTPATIENT
Start: 2023-06-03

## 2023-06-03 RX ORDER — SODIUM CHLORIDE 9 MG/ML
INJECTION, SOLUTION INTRAVENOUS PRN
Status: CANCELLED | OUTPATIENT
Start: 2023-06-03

## 2023-06-03 RX ORDER — PANTOPRAZOLE SODIUM 40 MG/1
40 TABLET, DELAYED RELEASE ORAL
Status: DISCONTINUED | OUTPATIENT
Start: 2023-06-04 | End: 2023-06-10 | Stop reason: HOSPADM

## 2023-06-03 RX ORDER — PANTOPRAZOLE SODIUM 40 MG/1
40 TABLET, DELAYED RELEASE ORAL
Status: CANCELLED | OUTPATIENT
Start: 2023-06-04

## 2023-06-03 RX ORDER — SODIUM CHLORIDE 0.9 % (FLUSH) 0.9 %
10 SYRINGE (ML) INJECTION PRN
Status: CANCELLED | OUTPATIENT
Start: 2023-06-03

## 2023-06-03 RX ORDER — ONDANSETRON 2 MG/ML
4 INJECTION INTRAMUSCULAR; INTRAVENOUS EVERY 6 HOURS PRN
Status: DISCONTINUED | OUTPATIENT
Start: 2023-06-03 | End: 2023-06-10 | Stop reason: HOSPADM

## 2023-06-03 RX ORDER — FERROUS SULFATE 325(65) MG
325 TABLET ORAL 2 TIMES DAILY WITH MEALS
Status: DISCONTINUED | OUTPATIENT
Start: 2023-06-05 | End: 2023-06-10 | Stop reason: HOSPADM

## 2023-06-03 RX ORDER — PROMETHAZINE HYDROCHLORIDE 12.5 MG/1
12.5 TABLET ORAL EVERY 6 HOURS PRN
Status: CANCELLED | OUTPATIENT
Start: 2023-06-03

## 2023-06-03 RX ORDER — ACETAMINOPHEN 325 MG/1
650 TABLET ORAL EVERY 6 HOURS PRN
Status: CANCELLED | OUTPATIENT
Start: 2023-06-03

## 2023-06-03 RX ORDER — INSULIN GLARGINE 100 [IU]/ML
20 INJECTION, SOLUTION SUBCUTANEOUS NIGHTLY
Status: DISCONTINUED | OUTPATIENT
Start: 2023-06-03 | End: 2023-06-10 | Stop reason: HOSPADM

## 2023-06-03 RX ORDER — POLYETHYLENE GLYCOL 3350 17 G/17G
17 POWDER, FOR SOLUTION ORAL DAILY PRN
Status: DISCONTINUED | OUTPATIENT
Start: 2023-06-03 | End: 2023-06-10 | Stop reason: HOSPADM

## 2023-06-03 RX ORDER — ONDANSETRON 2 MG/ML
4 INJECTION INTRAMUSCULAR; INTRAVENOUS EVERY 6 HOURS PRN
Status: CANCELLED | OUTPATIENT
Start: 2023-06-03

## 2023-06-03 RX ORDER — DEXTROSE MONOHYDRATE 100 MG/ML
INJECTION, SOLUTION INTRAVENOUS CONTINUOUS PRN
Status: CANCELLED | OUTPATIENT
Start: 2023-06-03

## 2023-06-03 RX ORDER — INSULIN GLARGINE 100 [IU]/ML
20 INJECTION, SOLUTION SUBCUTANEOUS NIGHTLY
Status: CANCELLED | OUTPATIENT
Start: 2023-06-03

## 2023-06-03 RX ADMIN — LEVOTHYROXINE SODIUM 125 MCG: 0.1 TABLET ORAL at 05:38

## 2023-06-03 RX ADMIN — Medication 10 ML: at 08:54

## 2023-06-03 RX ADMIN — PANTOPRAZOLE SODIUM 40 MG: 40 TABLET, DELAYED RELEASE ORAL at 05:38

## 2023-06-03 RX ADMIN — INSULIN LISPRO 2 UNITS: 100 INJECTION, SOLUTION INTRAVENOUS; SUBCUTANEOUS at 18:15

## 2023-06-03 RX ADMIN — INSULIN GLARGINE 20 UNITS: 100 INJECTION, SOLUTION SUBCUTANEOUS at 20:47

## 2023-06-03 RX ADMIN — CEFAZOLIN 2000 MG: 1 INJECTION, POWDER, FOR SOLUTION INTRAMUSCULAR; INTRAVENOUS at 10:16

## 2023-06-03 RX ADMIN — CEFAZOLIN 2000 MG: 1 INJECTION, POWDER, FOR SOLUTION INTRAMUSCULAR; INTRAVENOUS at 18:14

## 2023-06-03 RX ADMIN — IRON SUCROSE 200 MG: 20 INJECTION, SOLUTION INTRAVENOUS at 08:48

## 2023-06-03 RX ADMIN — Medication 10 ML: at 20:48

## 2023-06-03 ASSESSMENT — PAIN SCALES - GENERAL
PAINLEVEL_OUTOF10: 0

## 2023-06-03 NOTE — DISCHARGE SUMMARY
Hospital Medicine Discharge Summary    Lucila Ortiz  :  1955  MRN:  [id number]    Admit date:  2023  Discharge date:  6/3/2023    Admitting Physician:  Avni Mchugh MD  Primary Care Physician:  PAULY Carias      Discharge Diagnoses:      *Sepsis, bacteremia present on admission. Patient had been treated appropriately with aggressive IV fluid infusion, initiation of antibiotic and lactic acid trending. Blood culture came back positive for staph Capitis. Sensitive to oxacillin. Patient had been on vancomycin. This organism is normal skin organism but may cause bacteremia. It is unknown whether this is true bacteremia or contamination but given the fact that this organism grew in 2 bottles I would have to suspect that this is true bacteremia. Sensitivities back. Bacteria is sensitive to oxacillin. Discontinued vancomycin and started patient on Ancef. Repeat blood culture is done this morning. *Extensive left leg cellulitis from the thigh down to the ankle which is probably the source of bacteremia. Improvement of the erythema over the last 48 hours. Changed Vancomycin to Ancef for given the sensitivity report seen on the blood culture. *Anemia, thrombocytopenia. This could be triggered by acute sepsis or caused by CLL and/or Gleevec and/or liver cirrhosis. Continue to monitor closely off [address] for now. *Hepatitis C.     *Fall, head contusion, negative CT head. *Left wrist bruising. No pain or tenderness in the left wrist.  Full range of motion. *TAMIKA/CKD, probably hemodynamically mediated in the setting of patient taking lisinopril and torsemide. This also could be triggered by sepsis. UA is bland. Kidney function is improving. Discontinued IV fluid. *Elevated troponin, trending down. No chest pain. No ST elevation or depression on EKG. Suspected troponin elevation secondary to elevated demand secondary to sepsis, less likely non-STEMI.   Unfortunately

## 2023-06-03 NOTE — PLAN OF CARE
Pt. Is in bed watching tv. She is A/O/ x 4, wears dentures. 1 Assist w/ a walker, contact guard, some . Takes her po medications whole w/ water. Last BM on 06/03/2023, no c/o constipation, abd pain, but having loose stools, Imodium given. HS BS at 183, no SSIC, received 20 units of Lantus. Call light is w/in reach. She is in bed resting comfortably. Will continue to monitor.

## 2023-06-03 NOTE — PROGRESS NOTES
2nd IMM reviewed with patient, signed, copy given.
Chart reviewed. Patient was admitted to [facility] after presenting to ED with reports of recent fall. Patient was evaluated by PT/OT. Collaborated with therapy and recommendation from is bryan Butts [facility] vs [facility] upon DC. This  met with patient to discuss DC planning and review recommendations. Patient reports residing at home alone and goal is to get stronger before returning home. Freedom of choice provided. Patient reports desire to admit to [address] [place] [address] for inpatient rehab once medical issues have resolved. It is worth noting that patient's insurance requires a pre-cert before patient can be elligable for inpatient skilled therapies. [facility] RN House supervisor updated on above.  SS to continue to follow and assist with smooth transition to [facility] unit pending DC and ins approval.
Chart reviewed. Patient's care discussed in daily quality rounds. Insurance approval for patient to admit to [facility] for inpatient skilled therapies was reported to have been received on 6/1/23. Dr. Linus Montiel notes plan for patient to be admitted to skilled unit tomorrow am (6/3). SS to continue to follow and assist with smooth transition to [facility] unit.
Facility/Department: [facility] MED SURG  UNIT  Daily Treatment Note  NAME: Narcisa Thapa  : 1955  MRN: [id number]    Date of Service: 6/3/2023    Discharge Recommendations:  Continue to assess pending progress, Home with Home health OT         Patient Diagnosis(es): The primary encounter diagnosis was Acute UTI. Diagnoses of Fall, initial encounter, Closed head injury, initial encounter, and Physical deconditioning were also pertinent to this visit. Assessment    Assessment: Pt seated in recliner and talkingto friend when approached for OT treatment. Pt motivated to complete shower. Mod cues for safety awarenss to complete simulated tub transfer. Pt unable to lift leg over tub height. CGA required to complete walk in shower transfer. Educated pt on AE for LE dressing and bathing w/ F understanding. Increased time and cues for sequencing and thoroughness to complete UE/LE bathing/dressing tasks. Activity Tolerance: Patient limited by fatigue;Patient limited by endurance; Patient limited by pain  Discharge Recommendations: Continue to assess pending progress;Home with Home health OT      Plan   Occupational Therapy Plan  Times Per Week: 4-7  Times Per Day: Once a day  Current Treatment Recommendations: Strengthening;ROM;Balance training;Functional mobility training; Endurance training; Safety education & training;Patient/Caregiver education & training;Self-Care / ADL;Equipment evaluation, education, & procurement;Home management training     Restrictions       Subjective   Subjective  Subjective: \"That would be nice. \" Pt's response to being asked to complete shower           Objective    Vitals     ADL  Grooming: Stand by assistance  Grooming Skilled Clinical Factors: Cob hair while seated  UE Bathing: Contact guard assistance  UE Bathing Skilled Clinical Factors: Increased time and cues for sequencing  LE Bathing:  Moderate assistance  LE Bathing Skilled Clinical Factors: Pt able to bathe to ankle area and unable to
Facility/Department: [facility] MED/OBS SURG UNIT  Physical Therapy Initial Assessment    Name: Thea Bateman  : 1955  MRN: [id number]  Date of Service: 2023    Discharge Recommendations:  Continue to assess pending progress          Patient Diagnosis(es): The primary encounter diagnosis was Acute UTI. Diagnoses of Fall, initial encounter, Closed head injury, initial encounter, and Physical deconditioning were also pertinent to this visit. Past Medical History:  has a past medical history of Diabetes mellitus ([facility] [address] Hypertension, Hypothyroidism, and Type 2 diabetes mellitus without complication ([facility] [address] Past Surgical History:  has a past surgical history that includes Cataract removal.    Assessment   Body Structures, Functions, Activity Limitations Requiring Skilled Therapeutic Intervention: Decreased functional mobility ; Decreased ROM; Decreased strength;Decreased endurance;Decreased balance  Assessment: 67yof with weakness and diffficulty walking, did not use AD previousl to admisiion- has FWW and cane at home. Pt lives alone and not safe to ambulate on own currently- high risk for falls iwth unsteadiness. Pt with BLE celleunlitis and weakness. Recommend skilled PT follow OBS/medical. Pt may benefit from short subcute stay versuss C with RN, PT, OT and aide. TBD over medical course. Pt agrees to plan and goals. Treatment Diagnosis: ataxia, difficulty walking, LE weakness  Therapy Prognosis: Good  Requires PT Follow-Up: Yes  Activity Tolerance  Activity Tolerance: Patient limited by fatigue;Patient limited by endurance  Activity Tolerance Comments: limited by balance     Plan   Physcial Therapy Plan  General Plan: 5-7 times per week (1-2x per day)  Safety Devices  Type of Devices:  All fall risk precautions in place, Bed alarm in place, Gait belt, Patient at risk for falls, Left in bed, Call light within reach     Restrictions  Restrictions/Precautions  Restrictions/Precautions: Fall Risk     Subjective
Facility/Department: [facility] MED SURG UNIT  Occupational Therapy Initial Assessment    Name: Elissa Quiroga  : 1955  MRN: [id number]  Date of Service: 2023    Discharge Recommendations:  Continue to assess pending progress, Home with Home health OT (vs [facility])          Patient Diagnosis(es): The primary encounter diagnosis was Acute UTI. Diagnoses of Fall, initial encounter, Closed head injury, initial encounter, and Physical deconditioning were also pertinent to this visit. Past Medical History:  has a past medical history of Diabetes mellitus ([address] Hypertension, Hypothyroidism, and Type 2 diabetes mellitus without complication ([address] Past Surgical History:  has a past surgical history that includes Cataract removal.    Treatment Diagnosis: Decreased ADL/IADL performance d/t falls, UTI, cellulitis      Assessment   Performance deficits / Impairments: Decreased functional mobility ; Decreased high-level IADLs;Decreased ADL status; Decreased endurance;Decreased strength;Decreased balance;Decreased safe awareness;Decreased posture  Assessment: Pt is a 76 y/o F admitted [facility] after sustaining fall at home, found to have UTI and BLE cellulitis. Pt reports she is normally Mod I for ADLs and IADLS, however recently was not feeling well over the past 3 days and was using FWW for support. Pt was attempting to clean her toilet when she lost her balance and fell. Pt lives in a one floor apartment with tub shower, does not have shower chair. Owns FWW and cane. Drives. OT eval completed with pt very tired and unable to keep her eyes open for more than 1-2 minutes, states she has not sleep during the night. Pt up to bathroom with FWW CGA, appears slightly unsteady and needs cues for safety. Recommend HHC vs [facility] to address fall risk and improve independence during ADLs/IADLs.   Treatment Diagnosis: Decreased ADL/IADL performance d/t falls, UTI, cellulitis  Prognosis: Good  REQUIRES OT FOLLOW-UP: Yes  Activity Tolerance  Activity
I had a long conversation with her son about her disease, presentation, progress and treatment plan. I answered all of his questions.
Lab called with gram positive cocci 2 out of 2. Dr. Janeth Hsu notified.   Electronically signed by Marilyn Greenberg RN on 6/1/2023 at 6:26 AM
Occupational Therapy  Facility/Department: [facility] MED SURG UNIT  Daily Treatment Note  NAME: Teresa Cordoba  : 1955  MRN: [id number]    Date of Service: 2023    Discharge Recommendations:   Home with Home health OT (vs [facility])         Patient Diagnosis(es): The primary encounter diagnosis was Acute UTI. Diagnoses of Fall, initial encounter, Closed head injury, initial encounter, and Physical deconditioning were also pertinent to this visit. Assessment    Activity Tolerance: Patient limited by fatigue;Patient limited by endurance; Patient limited by pain    Pt. Was agreeable to bathing/dressing with OT sitting EOB. Pt.'s ADL status is listed below. Pt. Needed frequent RB due to increased fatigue and SOB. Trained and educated pt. In energy conservation techniques to decrease fatigue. Pt. Tolerated UB exercises well with no weights, RB in between, 15 reps each to increase UB strength and function. Pt. Was cooperative. Pt. Les Jacks slowly and appeared fatigue. Answered pt.'s questions and concerns. Pt. Stated her pain is 5/10 in legs. Pt.'s O2 was 95%. Plan   Occupational Therapy Plan  Times Per Week: 4-7  Times Per Day: Once a day  Current Treatment Recommendations: Strengthening;ROM;Balance training;Functional mobility training; Endurance training; Safety education & training;Patient/Caregiver education & training;Self-Care / ADL;Equipment evaluation, education, & procurement;Home management training     Restrictions   Restrictions/Precautions  Restrictions/Precautions: Fall Risk  Required Braces or Orthoses?: No    Subjective       Pt. Was agreeable to bathing/dressing with OT. Objective    Vitals: 95% O2         Set up bath sitting EOB , pt.  Was too tired to get up at this time  ADL  Grooming: Contact guard assistance  UE Bathing: Minimal assistance  LE Bathing: Maximum assistance  UE Dressing: Stand by assistance (change gown)  LE Dressing: Dependent/Total;Maximum assistance (change socks)  Additional
Patient  arrives to room 208 in w/c accompanied by this nurse from ED s/p fall at home. She is alert, oriented to person, place, time, and circumstance. Denies pain. Somewhat tachypneic in no apparent distress. Weak and lethargic. Transfers to bed with minimal assist. Oriented to call light, room, and bed controls. Please refer to flowsheet for complete assessment.
Patient continues to feel sick and weak. Unable to specifically pinpoint any single complaint. She is feeling well. No chest pain palpitation. No abdominal pain. Nausea but no vomiting. Poor appetite. The patient is sleeping all the time. ROS: 12 system review otherwise is negative for acute signs or symptoms over the last 24 hrs. General appearance: alert, appears stated age and cooperative, no apparent distress. Patient was sleeping when I entered her room. I woke her up. Morbidly obese. Skin: Skin color, texture, turgor normal. No rashes or lesions  HEENT: Head: Normocephalic, no lesions, without obvious abnormality. Neck: no adenopathy, no carotid bruit, no JVD, supple, symmetrical, trachea midline, and thyroid not enlarged, symmetric, no tenderness/mass/nodules  Lungs: clear to auscultation bilaterally  Heart: regular rate and rhythm, S1, S2 normal, no murmur, click, rub or gallop  Abdomen: soft, non-tender; bowel sounds normal; no masses,  no organomegaly significant increased abdominal girth limiting the validity of her abdominal exam.  Extremities: +1 pitting edema. Erythema involving the left leg from the thigh down to the shin medially, laterally and anteriorly. Induration involving the anterior shin associated with bullous formation. Neurologic: Mental status: Alert, oriented, thought content appropriate      Assessment and plan:     *Sepsis, bacteremia present on admission. Patient had been treated appropriately with aggressive IV fluid infusion, initiation of antibiotic and lactic acid trending. Blood culture came back positive for staph. Unknown whether it is MSSA or MRSA. Patient has been on Rocephin. I added vancomycin today. Repeat blood culture in 48 hours. If her bacteremia persist I would recommend additional studies such as echo rule out endocarditis. *Extensive left leg cellulitis from the thigh down to the ankle which is probably the source of bacteremia.
Patient is feeling better. Her appetite is better. Less pain and discomfort in the left leg. No chest pain or palpitation. No abdominal pain, nausea or vomiting. ROS: 12 system review otherwise is negative for acute signs or symptoms over the last 24 hrs. General appearance: alert, appears stated age and cooperative, no apparent distress. Patient was sleeping when I entered her room. I woke her up. Morbidly obese. Skin: Skin color, texture, turgor normal. No rashes or lesions  HEENT: Head: Normocephalic, no lesions, without obvious abnormality. Neck: no adenopathy, no carotid bruit, no JVD, supple, symmetrical, trachea midline, and thyroid not enlarged, symmetric, no tenderness/mass/nodules  Lungs: clear to auscultation bilaterally  Heart: regular rate and rhythm, S1, S2 normal, no murmur, click, rub or gallop  Abdomen: soft, non-tender; bowel sounds normal; no masses,  no organomegaly significant increased abdominal girth limiting the validity of her abdominal exam.  Extremities: +1 pitting edema. Improvement of the erythema involving the left leg from the thigh down to the shin medially, laterally and anteriorly. Induration involving the anterior shin associated with bullous formation. Neurologic: Mental status: Alert, oriented, thought content appropriate      Assessment and plan:     *Sepsis, bacteremia present on admission. Patient had been treated appropriately with aggressive IV fluid infusion, initiation of antibiotic and lactic acid trending. Blood culture came back positive for staph. Unknown whether it is MSSA or MRSA. Antibiotics switched from Rocephin to vancomycin when cultures came back positive. Waiting on sensitivity report. Continue vancomycin at this time. Repeat blood culture in a.m. *Extensive left leg cellulitis from the thigh down to the ankle which is probably the source of bacteremia. Improvement of the erythema over the last 24 hours.   Continue vancomycin at
Physical Therapy  Facility/Department: [facility] MED SURG UNIT  Daily Treatment Note  NAME: Senia Monday  : 1955  MRN: [id number]    Date of Service: 2023    Discharge Recommendations:  Continue to assess pending progress        Patient Diagnosis(es): The primary encounter diagnosis was Acute UTI. Diagnoses of Fall, initial encounter, Closed head injury, initial encounter, and Physical deconditioning were also pertinent to this visit. Assessment   Assessment: (P) Pt. tolerated B LE supine ther ex of 10 reps of all available planes of motion. Pt. followed with good technique getting out of bed; however, pt. c/o initial dizziness. Pt. steady without LOB. Dizziness went away with short rest. Pt. followed with tolerating ambulating slight increase gait distance this a.m. However, pt. demo's low tolerance to sit up in chair for meal time with encouragement. Pt. continued to report I am more comfortable in bed. Activity Tolerance: (P) Patient limited by fatigue;Patient limited by endurance; Patient limited by pain     Plan    Physcial Therapy Plan  General Plan: (P) 5-7 times per week (1-2 x a day)     Restrictions  Restrictions/Precautions  Restrictions/Precautions: Fall Risk  Required Braces or Orthoses?: No     Subjective    Subjective  Subjective: (P) Pt. in bed snoring at arrival. Pt. easily alert with verbal stimuli. Pt. reports 10/10 L     Objective   Vitals     Bed Mobility Training  Supine to Sit: (P) Stand-by assistance (uses rail with B UE support and c/o dizziness with initial sit position.  Dizziness goes away.)  Sit to Supine: (P) Minimum assistance (to elevate LE onto bed.)  Balance  Sitting: (P) Intact  Standing: (P) With support (Pt. requires at least one UE support for balance.)  Transfer Training  Transfer Training: (P) Yes  Overall Level of Assistance: (P) Stand-by assistance;Contact-guard assistance  Sit to Stand: (P) Stand-by assistance;Contact-guard assistance  Stand to Sit: (P) Stand-by
Physical Therapy  Facility/Department: [facility] MED SURG UNIT  Daily Treatment Note  NAME: Radha Estevez  : 1955  MRN: [id number]    Date of Service: 2023    Discharge Recommendations:  Continue to assess pending progress        Patient Diagnosis(es): The primary encounter diagnosis was Acute UTI. Diagnoses of Fall, initial encounter, Closed head injury, initial encounter, and Physical deconditioning were also pertinent to this visit. Assessment   Assessment: (P) Pt. c/o increase pain and heaviness with L LE > R LE. Low tolerance to participate initially. Pt. did agree and participate with small short commands for each task. Pt. started with tolerating supine LE ther ex of 10 reps for ROM and strength. Pt. followed with sitting up edge of bed and ambulating up to 20 feet with slow movement and short rest breaks. Pt. became dizzy with transition supine to sit. Pt. felt better with short rest break seated. Verbal cues use for tsf training to improve safety awareness. Min assistance needed with bed mobility, tsf's, and gait function. Assisted pt. back in bed post intervention. Activity Tolerance: Patient limited by fatigue;Patient limited by endurance; Patient limited by pain     Plan    Physcial Therapy Plan  General Plan: 5-7 times per week (1-2x a day)     Restrictions  Restrictions/Precautions  Restrictions/Precautions: Fall Risk  Required Braces or Orthoses?: No     Subjective    Subjective  Subjective: Pt. in bed and c/o left leg is hurting really bad 10/10. Notified nurse upon pt. request for care. Left leg feeling so heavy when I try to lift it. I wish someone could take the pressure off my left leg.   Pain: 7/10 L LE     Objective   Vitals     Bed Mobility Training  Bed Mobility Training: Yes  Overall Level of Assistance:  (Min A)  Interventions: Safety awareness training;Verbal cues  Supine to Sit: (P) Minimum assistance (Pt. able to sit up with bed management assistance for flat bed, and pt. using assist
Physical Therapy  Facility/Department: [facility] MED SURG UNIT  Daily Treatment Note  NAME: Alfredo Petty  : 1955  MRN: [id number]    Date of Service: 2023    Discharge Recommendations:  Continue to assess pending progress        Patient Diagnosis(es): The primary encounter diagnosis was Acute UTI. Diagnoses of Fall, initial encounter, Closed head injury, initial encounter, and Physical deconditioning were also pertinent to this visit. Assessment   Assessment: (P) Pt. in bed in p.m. reporting less back pain when she is in bed. Pt. agreed to therapy with tolerating two gait training trials with slight improve gait distance tolerance with encouragement. Pt. sat up in chair for 5 minutes before complaining of back pain. Ambulated pt. back to edge of bed where pt. tolerated 15 reps of B LAQ for ROM and strengthening. Pt. requires min assistance to elevated LE onto bed after spending extra time to encourage extra effort from pt. to improve fucntional skills. Activity Tolerance: (P) Patient limited by fatigue;Patient limited by endurance; Patient limited by pain     Plan    Physcial Therapy Plan  General Plan: (P) 5-7 times per week (1-2x a day)     Restrictions  Restrictions/Precautions  Restrictions/Precautions: Fall Risk  Required Braces or Orthoses?: No     Subjective    Subjective  Subjective: (P) Pt. in bed at arrival with eyes open. Pt. reports 7/10 L ankle pain. Pt. agreeable to therapy. Pt. c/o back pain with sit position refusing to sit up in recliner post intervention. Objective   Vitals     Bed Mobility Training  Supine to Sit: (P) Stand-by assistance  Sit to Supine: Minimum assistance (to elevate LE onto bed.)  Balance  Sitting: Intact  Standing: With support (Pt. requires at least one UE support for balance.)  Transfer Training  Transfer Training: (P) Yes  Overall Level of Assistance: (P) Stand-by assistance;Contact-guard assistance  Interventions: (P) Verbal cues; Safety awareness training (Needs
Physical Therapy  Facility/Department: [facility] MED SURG UNIT  Daily Treatment Note  NAME: Ronal Iverson  : 1955  MRN: [id number]    Date of Service: 6/3/2023    Discharge Recommendations:  Continue to assess pending progress        Patient Diagnosis(es): The primary encounter diagnosis was Acute UTI. Diagnoses of Fall, initial encounter, Closed head injury, initial encounter, and Physical deconditioning were also pertinent to this visit. Assessment   Assessment: Patient able to improve walking as session progressed (first bout she was a little unsteady). Patient motivated and able to perform seated exercises well with minimal cuing (performed as follow along this visit). Activity Tolerance: Patient limited by fatigue;Patient limited by endurance; Patient limited by pain     Plan    Physcial Therapy Plan  General Plan: 5-7 times per week     Restrictions  Restrictions/Precautions  Restrictions/Precautions: Fall Risk  Required Braces or Orthoses?: No     Subjective    Subjective  Subjective: Patient in bed in [place] (right) posture talking on telephone upon arrival. Willing to walk today. Objective   Vitals     Bed Mobility Training  Bed Mobility Training: Yes  Overall Level of Assistance: Minimum assistance  Interventions: Safety awareness training;Verbal cues  Supine to Sit: Stand-by assistance  Sit to Supine: Minimum assistance  Balance  Sitting: Intact  Standing: With support  Transfer Training  Transfer Training: Yes  Overall Level of Assistance: Stand-by assistance;Contact-guard assistance  Interventions: Verbal cues; Safety awareness training  Sit to Stand: Stand-by assistance;Contact-guard assistance  Stand to Sit: Stand-by assistance;Contact-guard assistance  Gait Training  Gait Training: Yes  Right Side Weight Bearing: As tolerated  Left Side Weight Bearing: As tolerated  Gait  Overall Level of Assistance: Contact-guard assistance  Interventions: Verbal cues; Safety awareness training  Base of Support:
Physical Therapy  Facility/Department: [facility] MED SURG UNIT  Daily Treatment Note  NAME: Chelsey Marinelli  : 1955  MRN: [id number]    Date of Service: 2023    Discharge Recommendations:  Continue to assess pending progress        Patient Diagnosis(es): The primary encounter diagnosis was Acute UTI. Diagnoses of Fall, initial encounter, Closed head injury, initial encounter, and Physical deconditioning were also pertinent to this visit. Assessment   Assessment: (P) Pt. participated and tolerated seated and horizontal B LE ther ex to assist with strengthening after several tsf's from w/c to edge of bed and edge of bed <-> bed side commode. Pt. complaints of some nausea at first, feeling hot, and thinks she might be dehydrate. Pt. was continuing to drink fluids throughout intervention. Moreover, pt. required more assistance with holding w/c and bedside commode in place with vc's on safety awareness with set up and locking brakes during slideboard tsf skills. Pt. demo's good UE and trunk strength to maneuver from one surface to another with at least stand by assistance for safety. Activity Tolerance: Patient limited by fatigue;Patient limited by endurance; Patient limited by pain     Plan    Physcial Therapy Plan  General Plan: 5-7 times per week (1-2x per day)     Restrictions  Restrictions/Precautions  Restrictions/Precautions: Fall Risk  Required Braces or Orthoses?: No     Subjective    Subjective  Subjective: Pt. lying in bed reports pain LLE 7/10. \"It feels so heavy. \"  Pain: 7/10 L LE     Objective   Vitals     Bed Mobility Training  Bed Mobility Training: Yes  Overall Level of Assistance:  (Min A)  Interventions: Safety awareness training;Verbal cues  Supine to Sit: Minimum assistance  Balance  Sitting: Intact  Standing: With support (FWW used for standing and mobility tasks, slightly unsteady and needs min vc for proper use of device)  Transfer Training  Transfer Training: Yes  Overall Level of Assistance:
Pt alert and oriented. Pt denies any pain or needs at this time. Pt up with a one assist. Pt call light in reach and bed alarm on. Pt on telemetry. Will continue to monitor pt.   Electronically signed by Alexandre Esquivel RN on 5/31/2023 at 9:38 PM
Spiritual Care Services     Summary of Visit:  Emotional and spiritual support provided for pt. Her son at the bedside. Both feeling confident in the care the pt is receiving. Pt is [place], important to her, has had difficulty for some years attending [employer] due to her health. Encounter Summary  Encounter Overview/Reason : Initial Encounter  Service Provided For[de-identified] Patient and family together  Referral/Consult From[de-identified] Rounding  Support System: Children, Family members  Complexity of Encounter: Low  Begin Time: 1400  End Time : 1420  Total Time Calculated: 20 min  Encounter   Type: Initial Screen/Assessment     Spiritual/Emotional needs  Type: Spiritual Support                      Spiritual Assessment/Intervention/Outcomes:    Assessment: Calm, Compromised coping    Intervention: Active listening, Discussed illness injury and its impact    Outcome: Comfort      Care Plan:    Plan and Referrals  Plan/Referrals: Continue Support (comment)    [id number] [address]   Electronically [facility] on 5/31/2023 at 2:26 PM.    To reach a  for emotional and spiritual support, place an [facility] consult request.   If a  is needed immediately, dial 0 and ask to page the on-call .
using [employer]. Goals  Short Term Goals  Time Frame for Short Term Goals: 3-5 days OBS- medical pt  Short Term Goal 1: Transfers with SBA and no vc to FWW, sit to suine and suipne to sit <= CS with inc time to perform  Short Term Goal 2: amb >= 75ft with least AD and any improved balance, no LOB with RPE post at <= 3/10 per pt report  Short Term Goal 3: curb step FWW with <= CGA  Short Term Goal 4: Increase LE strength and endurance by tolerating x10 LE seatead or supine KATHYA  Short Term Goal 5: safe discharge plan in place  Long Term Goals  Time Frame for Long Term Goals : same as [facility] OBS/ medical pt  Patient Goals   Patient Goals : \" I need to be strong enough and feel better to get around on my own. I live alone. \"    Education       Therapy Time   Individual Concurrent Group Co-treatment   Time In 1857         Time Out 0930         Minutes [id number] [place], [place]

## 2023-06-04 PROBLEM — A41.9 SEPSIS (HCC): Status: ACTIVE | Noted: 2023-06-04

## 2023-06-04 LAB
ANION GAP SERPL CALCULATED.3IONS-SCNC: 7 MEQ/L (ref 9–15)
ANISOCYTOSIS BLD QL SMEAR: ABNORMAL
BACTERIA BLD CULT ORG #2: NORMAL
BACTERIA BLD CULT: NORMAL
BASOPHILS # BLD: 0 K/UL (ref 0–0.1)
BASOPHILS NFR BLD: 1.6 % (ref 0.1–1.2)
BUN SERPL-MCNC: 24 MG/DL (ref 8–23)
BURR CELLS BLD QL SMEAR: ABNORMAL
CALCIUM SERPL-MCNC: 8.1 MG/DL (ref 8.5–9.9)
CHLORIDE SERPL-SCNC: 117 MEQ/L (ref 95–107)
CO2 SERPL-SCNC: 22 MEQ/L (ref 20–31)
CREAT SERPL-MCNC: 1.03 MG/DL (ref 0.5–0.9)
EOSINOPHIL # BLD: 0.1 K/UL (ref 0–0.4)
EOSINOPHIL NFR BLD: 3 % (ref 0.7–5.8)
ERYTHROCYTE [DISTWIDTH] IN BLOOD BY AUTOMATED COUNT: 16.8 % (ref 11.7–14.4)
GLUCOSE BLD-MCNC: 105 MG/DL (ref 70–99)
GLUCOSE BLD-MCNC: 152 MG/DL (ref 70–99)
GLUCOSE BLD-MCNC: 190 MG/DL (ref 70–99)
GLUCOSE BLD-MCNC: 230 MG/DL (ref 70–99)
GLUCOSE SERPL-MCNC: 114 MG/DL (ref 70–99)
HCT VFR BLD AUTO: 24.8 % (ref 37–47)
HGB BLD-MCNC: 7.6 G/DL (ref 11.2–15.7)
IMM GRANULOCYTES # BLD: 0.4 K/UL
IMM GRANULOCYTES NFR BLD: 9.6 %
LYMPHOCYTES # BLD: 1 K/UL (ref 1.2–3.7)
LYMPHOCYTES NFR BLD: 27 %
MACROCYTES BLD QL SMEAR: ABNORMAL
MCH RBC QN AUTO: 32.5 PG (ref 25.6–32.2)
MCHC RBC AUTO-ENTMCNC: 30.6 % (ref 32.2–35.5)
MCV RBC AUTO: 106 FL (ref 79.4–94.8)
MONOCYTES # BLD: 0.2 K/UL (ref 0.2–0.9)
MONOCYTES NFR BLD: 6 % (ref 4.7–12.5)
NEUTROPHILS # BLD: 2.4 K/UL (ref 1.6–6.1)
NEUTS BAND NFR BLD MANUAL: 7 % (ref 5–11)
NEUTS SEG NFR BLD: 57 % (ref 34–71.1)
OVALOCYTES BLD QL SMEAR: ABNORMAL
PERFORMED ON: ABNORMAL
PLATELET # BLD AUTO: 40 K/UL (ref 182–369)
PLATELET BLD QL SMEAR: ABNORMAL
POLYCHROMASIA BLD QL SMEAR: ABNORMAL
POTASSIUM SERPL-SCNC: 4.2 MEQ/L (ref 3.4–4.9)
RBC # BLD AUTO: 2.34 M/UL (ref 3.93–5.22)
SODIUM SERPL-SCNC: 146 MEQ/L (ref 135–144)
TROPONIN T SERPL-MCNC: 0.01 NG/ML (ref 0–0.01)
WBC # BLD AUTO: 3.8 K/UL (ref 4–10)

## 2023-06-04 PROCEDURE — 36415 COLL VENOUS BLD VENIPUNCTURE: CPT

## 2023-06-04 PROCEDURE — 80048 BASIC METABOLIC PNL TOTAL CA: CPT

## 2023-06-04 PROCEDURE — 84484 ASSAY OF TROPONIN QUANT: CPT

## 2023-06-04 PROCEDURE — 97116 GAIT TRAINING THERAPY: CPT

## 2023-06-04 PROCEDURE — 2580000003 HC RX 258: Performed by: INTERNAL MEDICINE

## 2023-06-04 PROCEDURE — 85025 COMPLETE CBC W/AUTO DIFF WBC: CPT

## 2023-06-04 PROCEDURE — 97165 OT EVAL LOW COMPLEX 30 MIN: CPT

## 2023-06-04 PROCEDURE — 97530 THERAPEUTIC ACTIVITIES: CPT

## 2023-06-04 PROCEDURE — 1200000002 HC SEMI PRIVATE SWING BED

## 2023-06-04 PROCEDURE — 97162 PT EVAL MOD COMPLEX 30 MIN: CPT

## 2023-06-04 PROCEDURE — 97110 THERAPEUTIC EXERCISES: CPT

## 2023-06-04 PROCEDURE — 6360000002 HC RX W HCPCS: Performed by: INTERNAL MEDICINE

## 2023-06-04 PROCEDURE — 6370000000 HC RX 637 (ALT 250 FOR IP): Performed by: INTERNAL MEDICINE

## 2023-06-04 RX ORDER — LOPERAMIDE HYDROCHLORIDE 2 MG/1
2 CAPSULE ORAL 3 TIMES DAILY PRN
Status: DISCONTINUED | OUTPATIENT
Start: 2023-06-04 | End: 2023-06-10 | Stop reason: HOSPADM

## 2023-06-04 RX ORDER — DEXTROSE MONOHYDRATE 50 MG/ML
INJECTION, SOLUTION INTRAVENOUS CONTINUOUS
Status: DISPENSED | OUTPATIENT
Start: 2023-06-04 | End: 2023-06-05

## 2023-06-04 RX ADMIN — INSULIN GLARGINE 20 UNITS: 100 INJECTION, SOLUTION SUBCUTANEOUS at 20:03

## 2023-06-04 RX ADMIN — LEVOTHYROXINE SODIUM 125 MCG: 0.1 TABLET ORAL at 05:31

## 2023-06-04 RX ADMIN — CEFAZOLIN 2000 MG: 1 INJECTION, POWDER, FOR SOLUTION INTRAMUSCULAR; INTRAVENOUS at 02:39

## 2023-06-04 RX ADMIN — PANTOPRAZOLE SODIUM 40 MG: 40 TABLET, DELAYED RELEASE ORAL at 05:31

## 2023-06-04 RX ADMIN — Medication 10 ML: at 08:49

## 2023-06-04 RX ADMIN — LOPERAMIDE HYDROCHLORIDE 2 MG: 2 CAPSULE ORAL at 17:32

## 2023-06-04 RX ADMIN — DEXTROSE MONOHYDRATE: 50 INJECTION, SOLUTION INTRAVENOUS at 10:46

## 2023-06-04 RX ADMIN — CEFAZOLIN 2000 MG: 1 INJECTION, POWDER, FOR SOLUTION INTRAMUSCULAR; INTRAVENOUS at 10:48

## 2023-06-04 RX ADMIN — IRON SUCROSE 200 MG: 20 INJECTION, SOLUTION INTRAVENOUS at 08:46

## 2023-06-04 RX ADMIN — CEFAZOLIN 2000 MG: 1 INJECTION, POWDER, FOR SOLUTION INTRAMUSCULAR; INTRAVENOUS at 17:27

## 2023-06-04 ASSESSMENT — PAIN SCALES - GENERAL
PAINLEVEL_OUTOF10: 0
PAINLEVEL_OUTOF10: 0

## 2023-06-05 PROBLEM — N17.9 ACUTE KIDNEY INJURY SUPERIMPOSED ON CKD (HCC): Status: ACTIVE | Noted: 2023-06-05

## 2023-06-05 PROBLEM — B95.7 STAPHYLOCOCCUS EPIDERMIDIS BACTEREMIA: Status: ACTIVE | Noted: 2023-06-05

## 2023-06-05 PROBLEM — L03.116 LEFT LEG CELLULITIS: Status: ACTIVE | Noted: 2023-06-05

## 2023-06-05 PROBLEM — K74.60 CIRRHOSIS OF LIVER WITH ASCITES (HCC): Status: ACTIVE | Noted: 2023-06-05

## 2023-06-05 PROBLEM — R18.8 CIRRHOSIS OF LIVER WITH ASCITES (HCC): Status: ACTIVE | Noted: 2023-06-05

## 2023-06-05 PROBLEM — N18.9 ACUTE KIDNEY INJURY SUPERIMPOSED ON CKD (HCC): Status: ACTIVE | Noted: 2023-06-05

## 2023-06-05 PROBLEM — R78.81 STAPHYLOCOCCUS EPIDERMIDIS BACTEREMIA: Status: ACTIVE | Noted: 2023-06-05

## 2023-06-05 LAB
GLUCOSE BLD-MCNC: 130 MG/DL (ref 70–99)
GLUCOSE BLD-MCNC: 153 MG/DL (ref 70–99)
GLUCOSE BLD-MCNC: 161 MG/DL (ref 70–99)
GLUCOSE BLD-MCNC: 195 MG/DL (ref 70–99)
PERFORMED ON: ABNORMAL

## 2023-06-05 PROCEDURE — 99222 1ST HOSP IP/OBS MODERATE 55: CPT | Performed by: INTERNAL MEDICINE

## 2023-06-05 PROCEDURE — 1200000002 HC SEMI PRIVATE SWING BED

## 2023-06-05 PROCEDURE — 6360000002 HC RX W HCPCS: Performed by: INTERNAL MEDICINE

## 2023-06-05 PROCEDURE — 6370000000 HC RX 637 (ALT 250 FOR IP): Performed by: INTERNAL MEDICINE

## 2023-06-05 PROCEDURE — 97530 THERAPEUTIC ACTIVITIES: CPT

## 2023-06-05 PROCEDURE — 2580000003 HC RX 258: Performed by: INTERNAL MEDICINE

## 2023-06-05 PROCEDURE — 97110 THERAPEUTIC EXERCISES: CPT

## 2023-06-05 PROCEDURE — 97535 SELF CARE MNGMENT TRAINING: CPT

## 2023-06-05 PROCEDURE — 97116 GAIT TRAINING THERAPY: CPT

## 2023-06-05 RX ADMIN — LEVOTHYROXINE SODIUM 125 MCG: 0.1 TABLET ORAL at 05:44

## 2023-06-05 RX ADMIN — FERROUS SULFATE TAB 325 MG (65 MG ELEMENTAL FE) 325 MG: 325 (65 FE) TAB at 18:07

## 2023-06-05 RX ADMIN — CEFAZOLIN 2000 MG: 1 INJECTION, POWDER, FOR SOLUTION INTRAMUSCULAR; INTRAVENOUS at 11:05

## 2023-06-05 RX ADMIN — IRON SUCROSE 200 MG: 20 INJECTION, SOLUTION INTRAVENOUS at 09:38

## 2023-06-05 RX ADMIN — FERROUS SULFATE TAB 325 MG (65 MG ELEMENTAL FE) 325 MG: 325 (65 FE) TAB at 09:33

## 2023-06-05 RX ADMIN — CEFAZOLIN 2000 MG: 1 INJECTION, POWDER, FOR SOLUTION INTRAMUSCULAR; INTRAVENOUS at 02:35

## 2023-06-05 RX ADMIN — Medication 10 ML: at 20:48

## 2023-06-05 RX ADMIN — PANTOPRAZOLE SODIUM 40 MG: 40 TABLET, DELAYED RELEASE ORAL at 05:44

## 2023-06-05 RX ADMIN — CEFAZOLIN 2000 MG: 1 INJECTION, POWDER, FOR SOLUTION INTRAMUSCULAR; INTRAVENOUS at 18:42

## 2023-06-05 RX ADMIN — INSULIN GLARGINE 20 UNITS: 100 INJECTION, SOLUTION SUBCUTANEOUS at 21:56

## 2023-06-05 RX ADMIN — Medication 10 ML: at 09:41

## 2023-06-05 NOTE — PLAN OF CARE
Nutrition Problem #1: Increased nutrient needs  Intervention: Food and/or Nutrient Delivery: Continue Current Diet, Start Oral Nutrition Supplement

## 2023-06-05 NOTE — CONSULTS
admission were positive for  methicillin sensitive Staphylococcus capitis  Repeat blood cultures done yesterday are pending  White blood cell count today was 3.8, hemoglobin of 7.6,  platelet count of 95P  Creatinine is down to 1.03 from 1.91 on admission    Assessment:  Coag negative staph bacteremia/possible contamination  Left lower extremity cellulitis  Acute kidney injury on chronic kidney disease  Bilateral lower extremity edema secondary to decompensated liver cirrhosis  Plan:  Continue IV Ancef till Yumiko 8  May switch to p.o.  Keflex 500 mg p.o. 3 times daily for 1 more week after IV antib iotics are done  Follow-up repeat blood cultures to ensure clearing of bacteremia  Follow-up CBC BMP

## 2023-06-06 LAB
AMMONIA PLAS-SCNC: 36 UMOL/L (ref 11–51)
BASE EXCESS ARTERIAL: -2
GLUCOSE BLD-MCNC: 114 MG/DL (ref 70–99)
GLUCOSE BLD-MCNC: 128 MG/DL (ref 70–99)
GLUCOSE BLD-MCNC: 150 MG/DL (ref 70–99)
GLUCOSE BLD-MCNC: 150 MG/DL (ref 70–99)
GLUCOSE BLD-MCNC: 207 MG/DL (ref 70–99)
HCO3 ARTERIAL: 24 MMOL/L (ref 21–29)
O2 SAT, ARTERIAL: 90 % (ref 93–100)
PCO2 ARTERIAL: 46 MM HG (ref 35–45)
PERFORMED ON: ABNORMAL
PH ARTERIAL: 7.32 (ref 7.35–7.45)
PO2 ARTERIAL: 63 MM HG (ref 75–108)
POC SAMPLE TYPE: ABNORMAL
TCO2 ARTERIAL: 26 MMOL/L (ref 70–99)

## 2023-06-06 PROCEDURE — 36600 WITHDRAWAL OF ARTERIAL BLOOD: CPT

## 2023-06-06 PROCEDURE — 82274 ASSAY TEST FOR BLOOD FECAL: CPT

## 2023-06-06 PROCEDURE — 97530 THERAPEUTIC ACTIVITIES: CPT

## 2023-06-06 PROCEDURE — 6370000000 HC RX 637 (ALT 250 FOR IP): Performed by: INTERNAL MEDICINE

## 2023-06-06 PROCEDURE — 97535 SELF CARE MNGMENT TRAINING: CPT

## 2023-06-06 PROCEDURE — 97110 THERAPEUTIC EXERCISES: CPT

## 2023-06-06 PROCEDURE — 99213 OFFICE O/P EST LOW 20 MIN: CPT

## 2023-06-06 PROCEDURE — 82140 ASSAY OF AMMONIA: CPT

## 2023-06-06 PROCEDURE — 1200000002 HC SEMI PRIVATE SWING BED

## 2023-06-06 PROCEDURE — 82803 BLOOD GASES ANY COMBINATION: CPT

## 2023-06-06 PROCEDURE — 6360000002 HC RX W HCPCS: Performed by: INTERNAL MEDICINE

## 2023-06-06 PROCEDURE — 2580000003 HC RX 258: Performed by: INTERNAL MEDICINE

## 2023-06-06 PROCEDURE — 97116 GAIT TRAINING THERAPY: CPT

## 2023-06-06 RX ORDER — FUROSEMIDE 10 MG/ML
20 INJECTION INTRAMUSCULAR; INTRAVENOUS ONCE
Status: COMPLETED | OUTPATIENT
Start: 2023-06-06 | End: 2023-06-06

## 2023-06-06 RX ADMIN — CEFAZOLIN 2000 MG: 1 INJECTION, POWDER, FOR SOLUTION INTRAMUSCULAR; INTRAVENOUS at 02:10

## 2023-06-06 RX ADMIN — FERROUS SULFATE TAB 325 MG (65 MG ELEMENTAL FE) 325 MG: 325 (65 FE) TAB at 18:10

## 2023-06-06 RX ADMIN — CEFAZOLIN 2000 MG: 1 INJECTION, POWDER, FOR SOLUTION INTRAMUSCULAR; INTRAVENOUS at 10:30

## 2023-06-06 RX ADMIN — Medication 10 ML: at 20:41

## 2023-06-06 RX ADMIN — CEFAZOLIN 2000 MG: 1 INJECTION, POWDER, FOR SOLUTION INTRAMUSCULAR; INTRAVENOUS at 18:10

## 2023-06-06 RX ADMIN — FERROUS SULFATE TAB 325 MG (65 MG ELEMENTAL FE) 325 MG: 325 (65 FE) TAB at 09:11

## 2023-06-06 RX ADMIN — INSULIN GLARGINE 20 UNITS: 100 INJECTION, SOLUTION SUBCUTANEOUS at 20:39

## 2023-06-06 RX ADMIN — PANTOPRAZOLE SODIUM 40 MG: 40 TABLET, DELAYED RELEASE ORAL at 05:23

## 2023-06-06 RX ADMIN — LEVOTHYROXINE SODIUM 125 MCG: 0.1 TABLET ORAL at 05:23

## 2023-06-06 RX ADMIN — FUROSEMIDE 20 MG: 10 INJECTION, SOLUTION INTRAMUSCULAR; INTRAVENOUS at 09:11

## 2023-06-07 LAB
ANION GAP SERPL CALCULATED.3IONS-SCNC: 7 MEQ/L (ref 9–15)
BASOPHILS # BLD: 0.1 K/UL (ref 0–0.1)
BASOPHILS NFR BLD: 2.4 % (ref 0.1–1.2)
BNP BLD-MCNC: 1200 PG/ML
BUN SERPL-MCNC: 20 MG/DL (ref 8–23)
CALCIUM SERPL-MCNC: 8.6 MG/DL (ref 8.5–9.9)
CHLORIDE SERPL-SCNC: 114 MEQ/L (ref 95–107)
CO2 SERPL-SCNC: 24 MEQ/L (ref 20–31)
CREAT SERPL-MCNC: 0.98 MG/DL (ref 0.5–0.9)
EOSINOPHIL # BLD: 0.2 K/UL (ref 0–0.4)
EOSINOPHIL NFR BLD: 4.1 % (ref 0.7–5.8)
ERYTHROCYTE [DISTWIDTH] IN BLOOD BY AUTOMATED COUNT: 18.2 % (ref 11.7–14.4)
GLUCOSE BLD-MCNC: 118 MG/DL (ref 70–99)
GLUCOSE BLD-MCNC: 127 MG/DL (ref 70–99)
GLUCOSE BLD-MCNC: 99 MG/DL (ref 70–99)
GLUCOSE SERPL-MCNC: 104 MG/DL (ref 70–99)
HCT VFR BLD AUTO: 24.6 % (ref 37–47)
HEMOCCULT STL QL IA: NORMAL
HGB BLD-MCNC: 7.7 G/DL (ref 11.2–15.7)
IMM GRANULOCYTES # BLD: 0.5 K/UL
IMM GRANULOCYTES NFR BLD: 11.3 %
LYMPHOCYTES # BLD: 0.8 K/UL (ref 1.2–3.7)
LYMPHOCYTES NFR BLD: 18 %
MCH RBC QN AUTO: 33.3 PG (ref 25.6–32.2)
MCHC RBC AUTO-ENTMCNC: 31.3 % (ref 32.2–35.5)
MCV RBC AUTO: 106.5 FL (ref 79.4–94.8)
MONOCYTES # BLD: 0.3 K/UL (ref 0.2–0.9)
MONOCYTES NFR BLD: 7.2 % (ref 4.7–12.5)
NEUTROPHILS # BLD: 2.6 K/UL (ref 1.6–6.1)
NEUTS SEG NFR BLD: 57 % (ref 34–71.1)
PERFORMED ON: ABNORMAL
PERFORMED ON: ABNORMAL
PERFORMED ON: NORMAL
PLATELET # BLD AUTO: 42 K/UL (ref 182–369)
PLATELET BLD QL SMEAR: ABNORMAL
POTASSIUM SERPL-SCNC: 4.3 MEQ/L (ref 3.4–4.9)
RBC # BLD AUTO: 2.31 M/UL (ref 3.93–5.22)
SLIDE REVIEW: ABNORMAL
SODIUM SERPL-SCNC: 145 MEQ/L (ref 135–144)
WBC # BLD AUTO: 4.6 K/UL (ref 4–10)

## 2023-06-07 PROCEDURE — 2580000003 HC RX 258: Performed by: INTERNAL MEDICINE

## 2023-06-07 PROCEDURE — 1200000002 HC SEMI PRIVATE SWING BED

## 2023-06-07 PROCEDURE — 36415 COLL VENOUS BLD VENIPUNCTURE: CPT

## 2023-06-07 PROCEDURE — 6360000002 HC RX W HCPCS: Performed by: INTERNAL MEDICINE

## 2023-06-07 PROCEDURE — 97530 THERAPEUTIC ACTIVITIES: CPT

## 2023-06-07 PROCEDURE — 85025 COMPLETE CBC W/AUTO DIFF WBC: CPT

## 2023-06-07 PROCEDURE — 99232 SBSQ HOSP IP/OBS MODERATE 35: CPT | Performed by: INTERNAL MEDICINE

## 2023-06-07 PROCEDURE — 83880 ASSAY OF NATRIURETIC PEPTIDE: CPT

## 2023-06-07 PROCEDURE — 97110 THERAPEUTIC EXERCISES: CPT

## 2023-06-07 PROCEDURE — 6370000000 HC RX 637 (ALT 250 FOR IP): Performed by: INTERNAL MEDICINE

## 2023-06-07 PROCEDURE — 97116 GAIT TRAINING THERAPY: CPT

## 2023-06-07 PROCEDURE — 80048 BASIC METABOLIC PNL TOTAL CA: CPT

## 2023-06-07 RX ORDER — CEPHALEXIN 500 MG/1
500 CAPSULE ORAL EVERY 8 HOURS SCHEDULED
Status: DISCONTINUED | OUTPATIENT
Start: 2023-06-07 | End: 2023-06-10 | Stop reason: HOSPADM

## 2023-06-07 RX ORDER — FUROSEMIDE 10 MG/ML
20 INJECTION INTRAMUSCULAR; INTRAVENOUS DAILY
Status: DISCONTINUED | OUTPATIENT
Start: 2023-06-07 | End: 2023-06-09

## 2023-06-07 RX ORDER — FUROSEMIDE 10 MG/ML
20 INJECTION INTRAMUSCULAR; INTRAVENOUS ONCE
Status: DISCONTINUED | OUTPATIENT
Start: 2023-06-07 | End: 2023-06-07

## 2023-06-07 RX ADMIN — Medication 10 ML: at 09:33

## 2023-06-07 RX ADMIN — FERROUS SULFATE TAB 325 MG (65 MG ELEMENTAL FE) 325 MG: 325 (65 FE) TAB at 17:32

## 2023-06-07 RX ADMIN — INSULIN GLARGINE 20 UNITS: 100 INJECTION, SOLUTION SUBCUTANEOUS at 21:31

## 2023-06-07 RX ADMIN — PANTOPRAZOLE SODIUM 40 MG: 40 TABLET, DELAYED RELEASE ORAL at 05:12

## 2023-06-07 RX ADMIN — LEVOTHYROXINE SODIUM 125 MCG: 0.1 TABLET ORAL at 05:12

## 2023-06-07 RX ADMIN — FERROUS SULFATE TAB 325 MG (65 MG ELEMENTAL FE) 325 MG: 325 (65 FE) TAB at 09:29

## 2023-06-07 RX ADMIN — Medication 10 ML: at 21:27

## 2023-06-07 RX ADMIN — CEPHALEXIN 500 MG: 500 CAPSULE ORAL at 21:27

## 2023-06-07 RX ADMIN — CEFAZOLIN 2000 MG: 1 INJECTION, POWDER, FOR SOLUTION INTRAMUSCULAR; INTRAVENOUS at 02:04

## 2023-06-07 RX ADMIN — ACETAMINOPHEN 650 MG: 325 TABLET ORAL at 05:22

## 2023-06-07 RX ADMIN — LOPERAMIDE HYDROCHLORIDE 2 MG: 2 CAPSULE ORAL at 13:08

## 2023-06-07 RX ADMIN — FUROSEMIDE 20 MG: 10 INJECTION, SOLUTION INTRAMUSCULAR; INTRAVENOUS at 09:26

## 2023-06-07 RX ADMIN — CEFAZOLIN 2000 MG: 1 INJECTION, POWDER, FOR SOLUTION INTRAMUSCULAR; INTRAVENOUS at 09:29

## 2023-06-07 ASSESSMENT — PAIN SCALES - GENERAL
PAINLEVEL_OUTOF10: 8
PAINLEVEL_OUTOF10: 0

## 2023-06-07 ASSESSMENT — PAIN DESCRIPTION - DESCRIPTORS: DESCRIPTORS: ACHING

## 2023-06-07 ASSESSMENT — PAIN DESCRIPTION - LOCATION: LOCATION: HEAD

## 2023-06-07 NOTE — DISCHARGE INSTR - COC
Resolved Resolved By    None active    Resolved    COVID-19 (Rule Out) 05/30/23 05/30/23 05/30/23 COVID-19, Rapid (Ordered)   05/30/23 Rule-Out Test Resulted            Nurse Assessment:  Last Vital Signs: BP (!) 148/60   Pulse 73   Temp 98.2 °F (36.8 °C) (Oral)   Resp 18   Ht 5' 1\" (1.549 m)   Wt 223 lb 1.7 oz (101.2 kg)   SpO2 92%   BMI 42.16 kg/m²     Last documented pain score (0-10 scale): Pain Level: 8  Last Weight:   Wt Readings from Last 1 Encounters:   06/07/23 223 lb 1.7 oz (101.2 kg)     Mental Status:  {IP PT MENTAL STATUS:20030}    IV Access:  { MARTY IV ACCESS:842306829}    Nursing Mobility/ADLs:  Walking   {P DME KSRU:948376151}  Transfer  {[facility] DME YOFO:557296127}  Bathing  {[facility] DME FDXE:922942643}  Dressing  {[facility] DME CPMJ:723074771}  Toileting  {P DME QBIX:555633183}  Feeding  {[facility] DME UYVI:263887313}  Med Admin  {[facility] DME MSNJ:299116758}  Med Delivery   { MARTY MED Delivery:218579704}    Wound Care Documentation and Therapy:  Wound 05/31/23 Pretibial Left; Anterior small superficial open area (Active)   Wound Image   06/06/23 1330   Wound Etiology Venous 06/07/23 0809   Dressing Status Clean;Dry; Intact 06/07/23 0809   Wound Cleansed Not Cleansed 06/07/23 0809   Dressing/Treatment Alginate with Ag; Foam 06/07/23 0809   Dressing Change Due 06/08/23 06/06/23 1330   Wound Length (cm) 2.5 cm 06/06/23 1330   Wound Width (cm) 2.5 cm 06/06/23 1330   Wound Depth (cm) 0 cm 06/06/23 1330   Wound Surface Area (cm^2) 6.25 cm^2 06/06/23 1330   Change in Wound Size % (l*w) 0 06/06/23 1330   Wound Volume (cm^3) 0 cm^3 06/06/23 1330   Wound Healing % 100 06/06/23 1330   Wound Assessment Pink/red 06/07/23 0809   Drainage Amount Scant 06/07/23 0809   Drainage Description Serous 06/07/23 0809   Odor None 06/07/23 0809   Sujata-wound Assessment Edematous 06/07/23 0809   Margins Undefined edges 06/06/23 1330   Wound Thickness Description not for Pressure Injury Partial thickness 06/06/23 1330   Number of days: 7

## 2023-06-07 NOTE — CARE COORDINATION
Patient attended care conference in the solarium. Patient has been experiencing lower extremity edema. Dr Velma Woods started the patient on IV Lasix daily. IV Ancef is completed today, patient will be changed to Keflex PO, TID for one week. Hgb is lower, and IV Venofer infusion therapy was completed yesterday. Labs will be rechecked 6/8/23. Patient voiced no nursing concerns.

## 2023-06-08 LAB
ANION GAP SERPL CALCULATED.3IONS-SCNC: 7 MEQ/L (ref 9–15)
ANISOCYTOSIS BLD QL SMEAR: ABNORMAL
BACTERIA BLD CULT ORG #2: NORMAL
BACTERIA BLD CULT: NORMAL
BASOPHILS # BLD: 0.1 K/UL (ref 0–0.1)
BASOPHILS NFR BLD: 1 % (ref 0.1–1.2)
BUN SERPL-MCNC: 20 MG/DL (ref 8–23)
CALCIUM SERPL-MCNC: 8.8 MG/DL (ref 8.5–9.9)
CHLORIDE SERPL-SCNC: 114 MEQ/L (ref 95–107)
CO2 SERPL-SCNC: 24 MEQ/L (ref 20–31)
CREAT SERPL-MCNC: 0.93 MG/DL (ref 0.5–0.9)
EOSINOPHIL # BLD: 0.1 K/UL (ref 0–0.4)
EOSINOPHIL NFR BLD: 2 % (ref 0.7–5.8)
ERYTHROCYTE [DISTWIDTH] IN BLOOD BY AUTOMATED COUNT: 19.1 % (ref 11.7–14.4)
GLUCOSE BLD-MCNC: 113 MG/DL (ref 70–99)
GLUCOSE BLD-MCNC: 132 MG/DL (ref 70–99)
GLUCOSE BLD-MCNC: 149 MG/DL (ref 70–99)
GLUCOSE BLD-MCNC: 87 MG/DL (ref 70–99)
GLUCOSE SERPL-MCNC: 83 MG/DL (ref 70–99)
HCT VFR BLD AUTO: 25.5 % (ref 37–47)
HGB BLD-MCNC: 7.8 G/DL (ref 11.2–15.7)
IMM GRANULOCYTES # BLD: 0.4 K/UL
IMM GRANULOCYTES NFR BLD: 8.3 %
LYMPHOCYTES # BLD: 1 K/UL (ref 1.2–3.7)
LYMPHOCYTES NFR BLD: 19 %
MACROCYTES BLD QL SMEAR: ABNORMAL
MCH RBC QN AUTO: 32.8 PG (ref 25.6–32.2)
MCHC RBC AUTO-ENTMCNC: 30.6 % (ref 32.2–35.5)
MCV RBC AUTO: 107.1 FL (ref 79.4–94.8)
MONOCYTES # BLD: 0.2 K/UL (ref 0.2–0.9)
MONOCYTES NFR BLD: 4 % (ref 4.7–12.5)
NEUTROPHILS # BLD: 3.5 K/UL (ref 1.6–6.1)
NEUTS BAND NFR BLD MANUAL: 8 % (ref 5–11)
NEUTS SEG NFR BLD: 61 % (ref 34–71.1)
PERFORMED ON: ABNORMAL
PERFORMED ON: NORMAL
PLATELET # BLD AUTO: 38 K/UL (ref 182–369)
PLATELET BLD QL SMEAR: ABNORMAL
POTASSIUM SERPL-SCNC: 4.2 MEQ/L (ref 3.4–4.9)
RBC # BLD AUTO: 2.38 M/UL (ref 3.93–5.22)
SLIDE REVIEW: ABNORMAL
SODIUM SERPL-SCNC: 145 MEQ/L (ref 135–144)
TOXIC GRANULATION: ABNORMAL
WBC # BLD AUTO: 5.1 K/UL (ref 4–10)

## 2023-06-08 PROCEDURE — 6370000000 HC RX 637 (ALT 250 FOR IP): Performed by: INTERNAL MEDICINE

## 2023-06-08 PROCEDURE — 97116 GAIT TRAINING THERAPY: CPT

## 2023-06-08 PROCEDURE — 1200000002 HC SEMI PRIVATE SWING BED

## 2023-06-08 PROCEDURE — 97535 SELF CARE MNGMENT TRAINING: CPT

## 2023-06-08 PROCEDURE — 2580000003 HC RX 258: Performed by: INTERNAL MEDICINE

## 2023-06-08 PROCEDURE — 97110 THERAPEUTIC EXERCISES: CPT

## 2023-06-08 PROCEDURE — 97530 THERAPEUTIC ACTIVITIES: CPT

## 2023-06-08 PROCEDURE — 85025 COMPLETE CBC W/AUTO DIFF WBC: CPT

## 2023-06-08 PROCEDURE — 80048 BASIC METABOLIC PNL TOTAL CA: CPT

## 2023-06-08 PROCEDURE — 6360000002 HC RX W HCPCS: Performed by: INTERNAL MEDICINE

## 2023-06-08 PROCEDURE — 36415 COLL VENOUS BLD VENIPUNCTURE: CPT

## 2023-06-08 RX ADMIN — Medication 10 ML: at 20:22

## 2023-06-08 RX ADMIN — FERROUS SULFATE TAB 325 MG (65 MG ELEMENTAL FE) 325 MG: 325 (65 FE) TAB at 17:49

## 2023-06-08 RX ADMIN — PANTOPRAZOLE SODIUM 40 MG: 40 TABLET, DELAYED RELEASE ORAL at 05:11

## 2023-06-08 RX ADMIN — CEPHALEXIN 500 MG: 500 CAPSULE ORAL at 22:54

## 2023-06-08 RX ADMIN — Medication 10 ML: at 09:22

## 2023-06-08 RX ADMIN — FUROSEMIDE 20 MG: 10 INJECTION, SOLUTION INTRAMUSCULAR; INTRAVENOUS at 09:21

## 2023-06-08 RX ADMIN — CEPHALEXIN 500 MG: 500 CAPSULE ORAL at 13:00

## 2023-06-08 RX ADMIN — INSULIN GLARGINE 20 UNITS: 100 INJECTION, SOLUTION SUBCUTANEOUS at 20:23

## 2023-06-08 RX ADMIN — FERROUS SULFATE TAB 325 MG (65 MG ELEMENTAL FE) 325 MG: 325 (65 FE) TAB at 09:21

## 2023-06-08 RX ADMIN — CEPHALEXIN 500 MG: 500 CAPSULE ORAL at 05:11

## 2023-06-08 RX ADMIN — LEVOTHYROXINE SODIUM 125 MCG: 0.1 TABLET ORAL at 05:11

## 2023-06-08 ASSESSMENT — PAIN SCALES - GENERAL
PAINLEVEL_OUTOF10: 0
PAINLEVEL_OUTOF10: 0

## 2023-06-08 NOTE — PLAN OF CARE
Pt. Is in bed watching tv. She is A/O/ x 4, wears dentures. 1 Assist w/ a walker, contact guard. Takes her po medications whole w/ water. Last BM on 06/05/2023, no c/o constipation, abd pain, or diarrhea. HS BS at 118, no SSIC, received 20 units of Lantus. She has a 22 ga IV in her Right AC that is clean, dry, and intact. Call light is w/in reach. She is in bed resting comfortably. Will continue to monitor.

## 2023-06-08 NOTE — CARE COORDINATION
Notice of Medicare Non-Coverage Letter date given: 6/8/2023   Notice of Medicare Non-Coverage Time Given: 36 am.  Notice of Medicare Non-Coverage Letter Given By: I reviewed [facility] with patient and she verbalized understanding. Her signature was obtained. Copy was given to the patient and the original to paper chart. Signed [facility] was faxed to [phone]. LCD 6/10/23.   Migel

## 2023-06-09 VITALS
TEMPERATURE: 98.5 F | SYSTOLIC BLOOD PRESSURE: 147 MMHG | HEIGHT: 61 IN | WEIGHT: 223.11 LBS | BODY MASS INDEX: 42.12 KG/M2 | RESPIRATION RATE: 18 BRPM | DIASTOLIC BLOOD PRESSURE: 76 MMHG | OXYGEN SATURATION: 99 % | HEART RATE: 70 BPM

## 2023-06-09 LAB
GLUCOSE BLD-MCNC: 115 MG/DL (ref 70–99)
GLUCOSE BLD-MCNC: 160 MG/DL (ref 70–99)
GLUCOSE BLD-MCNC: 79 MG/DL (ref 70–99)
PERFORMED ON: ABNORMAL
PERFORMED ON: ABNORMAL
PERFORMED ON: NORMAL

## 2023-06-09 PROCEDURE — 1200000002 HC SEMI PRIVATE SWING BED

## 2023-06-09 PROCEDURE — 97110 THERAPEUTIC EXERCISES: CPT

## 2023-06-09 PROCEDURE — 97530 THERAPEUTIC ACTIVITIES: CPT

## 2023-06-09 PROCEDURE — 6360000002 HC RX W HCPCS: Performed by: INTERNAL MEDICINE

## 2023-06-09 PROCEDURE — 6370000000 HC RX 637 (ALT 250 FOR IP): Performed by: INTERNAL MEDICINE

## 2023-06-09 PROCEDURE — 97116 GAIT TRAINING THERAPY: CPT

## 2023-06-09 PROCEDURE — 2580000003 HC RX 258: Performed by: INTERNAL MEDICINE

## 2023-06-09 PROCEDURE — 97535 SELF CARE MNGMENT TRAINING: CPT

## 2023-06-09 PROCEDURE — 99232 SBSQ HOSP IP/OBS MODERATE 35: CPT | Performed by: INTERNAL MEDICINE

## 2023-06-09 RX ORDER — FUROSEMIDE 40 MG/1
40 TABLET ORAL DAILY
Status: DISCONTINUED | OUTPATIENT
Start: 2023-06-09 | End: 2023-06-10 | Stop reason: HOSPADM

## 2023-06-09 RX ORDER — CEPHALEXIN 500 MG/1
500 CAPSULE ORAL 3 TIMES DAILY
Qty: 21 CAPSULE | Refills: 0 | Status: SHIPPED | OUTPATIENT
Start: 2023-06-09 | End: 2023-06-10 | Stop reason: HOSPADM

## 2023-06-09 RX ADMIN — FERROUS SULFATE TAB 325 MG (65 MG ELEMENTAL FE) 325 MG: 325 (65 FE) TAB at 08:48

## 2023-06-09 RX ADMIN — Medication 10 ML: at 08:48

## 2023-06-09 RX ADMIN — FUROSEMIDE 20 MG: 10 INJECTION, SOLUTION INTRAMUSCULAR; INTRAVENOUS at 08:48

## 2023-06-09 RX ADMIN — CEPHALEXIN 500 MG: 500 CAPSULE ORAL at 05:30

## 2023-06-09 RX ADMIN — FERROUS SULFATE TAB 325 MG (65 MG ELEMENTAL FE) 325 MG: 325 (65 FE) TAB at 16:53

## 2023-06-09 RX ADMIN — CEPHALEXIN 500 MG: 500 CAPSULE ORAL at 14:27

## 2023-06-09 RX ADMIN — PANTOPRAZOLE SODIUM 40 MG: 40 TABLET, DELAYED RELEASE ORAL at 05:24

## 2023-06-09 RX ADMIN — CEPHALEXIN 500 MG: 500 CAPSULE ORAL at 22:13

## 2023-06-09 RX ADMIN — LEVOTHYROXINE SODIUM 125 MCG: 0.1 TABLET ORAL at 05:24

## 2023-06-09 RX ADMIN — INSULIN GLARGINE 20 UNITS: 100 INJECTION, SOLUTION SUBCUTANEOUS at 21:04

## 2023-06-09 NOTE — PLAN OF CARE
Problem: Discharge Planning  Goal: Discharge to home or other facility with appropriate resources  Outcome: Progressing     Problem: Safety - Adult  Goal: Free from fall injury  Outcome: Progressing     Problem: Nutrition Deficit:  Goal: Optimize nutritional status  Outcome: Progressing     Problem: Chronic Conditions and Co-morbidities  Goal: Patient's chronic conditions and co-morbidity symptoms are monitored and maintained or improved  Outcome: Progressing     Problem: Pain  Goal: Verbalizes/displays adequate comfort level or baseline comfort level  Outcome: Progressing

## 2023-06-09 NOTE — PLAN OF CARE
Nutrition Problem #1: Altered nutrition-related lab values  Intervention: Food and/or Nutrient Delivery: Continue Current Diet, Discontinue Oral Nutrition Supplement

## 2023-06-09 NOTE — PROGRESS NOTES
Assumed care of pt. Pt resting in bed awake, A&Ox4, respirations equal and unlabored, denies pain and SOB. Bed in low position, bed alarm engaged for safety, call light in reach.
Avasys alerted staff patient exiting bed. Patient declining external urinary catheter at this time and wished to void in toilet. 2x assist to bathroom with wheeled walker. Tolerated ambulation well with minimal to moderate assist. Bed alarm set, continue with avasys.
Chart reviewed. Patient continues to receive skilled therapies at [facility]. Multidisciplinary team met with patient in care conference with son Roscoe Hernández present via phone. Discussed patient's care and on-going DC planning. Patient reports feeling confident in the care currently receiving at [facility]. Patient reports feeling better and plan is to return home with prn assistance from family. Therapy recommending skilled [facility] upon DC. [facility] of choice provided. [facility] identified as agency of choice. Discussed help at home needs and reviewed PASSPORT services. Patient reports interest in receiving PASSPORT services. SS to follow after care conference and assist patient in completing PASSPORT application. It is worth noting that this  did contact [facility] with new referral.  Marcia Rivera reports that [facility] can accept patient and will follow upon DC. MARTY completed.   SS to continue to follow
Chart reviewed. Patient's care discussed in daily quality rounds. Patient's ins reported to have issued notice of non-coverage with LCD 6/10. Plan is for patient to DC back home on Sat. 9/10 with [facility] to follow. Voice mail left for [facility] regarding DC date. Patient reports family plans to transport patient home on Sat. 6/10. SS to continue to follow as needed while patient is at [facility] [place].
Comprehensive Nutrition Assessment    Type and Reason for Visit:  Initial    Nutrition Recommendations/Plan:   Start Carb Controlled (4carb/meal), HALLIE diet  Start Diabetic ONS once daily     Malnutrition Assessment:  Malnutrition Status:  No malnutrition (06/05/23 1129)    Context:  Social/Environmental Circumstances     Findings of the 6 clinical characteristics of malnutrition:  Energy Intake:  Mild decrease in energy intake (Comment)  Weight Loss:  No significant weight loss     Body Fat Loss:  No significant body fat loss     Muscle Mass Loss:  No significant muscle mass loss    Fluid Accumulation:  Unable to assess     Strength:  Not Performed    Nutrition Assessment:    Recommend Carb Controlled and HALLIE diet d/t DM dx. and high sodium labs. Starting on Diabetic ONS per pt preference and reported fair appetite in addition to cellulitis/wound healing. Nutrition Related Findings:    The patient is a 75 y.o. female with PMH of CLL. Patient came in with a combination of symptoms. She was found to have sepsis and the left leg cellulitis. 2 blood cultures came back positive for Staph capitis. Patient was seen by physical therapy. She is recommended to have short term of the skilled therapy rehabilitation therefore she was admitted to [facility] to complete her recommended intravenous antibiotic course and participate in therapy program.  Patient is doing well overall. No abdominal pain, nausea or vomiting. No headaches, loss of conscious or seizure. Admit weight 223# bedscale. Noted that weight in March and May reported by pt was ~ 180#. Previous 2022 weights 208-220#. BMI classifies as obesity III. Diet is regular, PO intake adequate. Pt reports appetite as  fair (sometimes good sometimes bad). Would lexx to tiral ONS- Diabetic. Would benefit from supplement for additional protein for wound healing. Declines diet info for home going.  Meds reviewed, skin- cellulitis noted- no pressure areas
Facility/Department: [facility] SUBACUTE UNIT  Daily Treatment Note  NAME: Medina Conteh  : 1955  MRN: [id number]    Date of Service: 2023    Discharge Recommendations:  Continue to assess pending progress, Home with Home health OT         Patient Diagnosis(es): There were no encounter diagnoses. Assessment    Assessment: OT answered pt's bed alarm ringing, pt found sitting up on EOB and was about to walk to bathroom. FWW placed in-front of pt with supervision level of assist needed for safe transition from EOB<>bathroom. VC needed for hand placement and safety. Pt washed hands and returned to chair for HEP education. Pt demonstrated competency with questions answered. Pt set up for lunch with all needs within reach. Activity Tolerance: Patient tolerated treatment well  Discharge Recommendations: Continue to assess pending progress;Home with Home health OT      Plan   Occupational Therapy Plan  Times Per Week: 4-7  Times Per Day: Once a day  Current Treatment Recommendations: Strengthening;ROM;Balance training;Functional mobility training; Endurance training; Safety education & training;Patient/Caregiver education & training;Self-Care / ADL;Equipment evaluation, education, & procurement;Home management training       Subjective   Subjective  Subjective: \"I had to lay down because my back and legs were aching. \"  Pain: 5/10 LLE  Orientation  Overall Orientation Status: Within Functional Limits  Orientation Level: Oriented X4  Pain: 5/10 LLE  Cognition  Overall Cognitive Status: WFL        Objective    Bed Mobility Training  Bed Mobility Training: Yes  Overall Level of Assistance: Modified independent (For supine>EOB transition with HOB elevated and pt used bed rails.)  Interventions: Safety awareness training;Verbal cues  Supine to Sit: Modified independent  Balance  Sitting: Intact  Standing: With support (FWW used for standing and mobility tasks supervision)  Transfer Training  Transfer Training: Yes  Overall
Facility/Department: [facility] SUBACUTE UNIT  Daily Treatment Note  NAME: Arsenio Reed  : 1955  MRN: [id number]    Date of Service: 2023    Discharge Recommendations:  Continue to assess pending progress, Home with assist PRN    HHC PT, OT, RN, aide    Patient Diagnosis(es): There were no encounter diagnoses. Assessment     Pt making steady progress with mobility. Pt needs encouragement to sit up more out of bed for endurance- LE elevated with edema and cellulitis. Pt will benefit from continued skilled subacute rehab along with IV needs. Recommend HHC, PT,OT, RN and aide at discharge    Plan  5-7x week, 1-2x per day        Restrictions  Restrictions/Precautions  Restrictions/Precautions: Fall Risk, Bed Alarm  Required Braces or Orthoses?: No     Subjective      I could get out more withtou this thing watching me. \" Educated necessary for safe ambulation as attached to IV multiple times per day. Objective    Pt and son via phone present for [address]. See separate noted for full report. Encouraged pt to sit outside of bed and build endurance for better endurance and functional therapy tolerance. Pt and son agreeable to [facility]. Sit to stand CGA/SBA  Amb 10 ft and 20 ft with FWW and SBA, gait belt for sfety, not attached to IV pole. Goals  Short Term Goals  Time Frame for Short Term Goals: 1-5 days- IV dependent  Short Term Goal 1: Supine to sit  out flat bed on left side of bed indep. without HR  Short Term Goal 2: amb >= 75ft with FFW with correct hand placement in transtions and safe use of walker and SBA  Short Term Goal 3: Perform active exercises BLE to promote strengtening and support functional mobility  Short Term Goal 4: Moniter pain level and O2 sat/pulse rate pre and post amb  Long Term Goals  Time Frame for Long Term Goals : 5-10days, d/c medically dependent  Long Term Goal 1: Indep, safe transitions with appropriate AD from bed to standing.   Long Term Goal 2:
Hospitalist Progress Note      PCP: PAULY Rojas    Date of Admission: 6/3/2023    Chief Complaint: weakness/edema    Subjective: pt eating breakfast, looks comfortable     Medications:  Reviewed    Infusion Medications    dextrose      sodium chloride       Scheduled Medications    furosemide  20 mg IntraVENous Daily    insulin glargine  20 Units SubCUTAneous Nightly    levothyroxine  125 mcg Oral Daily    sodium chloride flush  10 mL IntraVENous 2 times per day    insulin lispro  0-8 Units SubCUTAneous TID WC    insulin lispro  0-4 Units SubCUTAneous Nightly    pantoprazole  40 mg Oral QAM AC    ceFAZolin  2,000 mg IntraVENous Q8H    ferrous sulfate  325 mg Oral BID WC     PRN Meds: loperamide, glucose, dextrose bolus **OR** dextrose bolus, glucagon (rDNA), dextrose, sodium chloride flush, sodium chloride, promethazine **OR** ondansetron, polyethylene glycol, acetaminophen **OR** acetaminophen    No intake or output data in the 24 hours ending 06/07/23 0829    Exam:    BP (!) 148/60   Pulse 73   Temp 98.2 °F (36.8 °C) (Oral)   Resp 18   Ht 5' 1\" (1.549 m)   SpO2 92%   BMI 42.14 kg/m²     General appearance: No apparent distress, appears stated age and cooperative. HEENT: Pupils equal, round, and reactive to light. Conjunctivae/corneas clear. Neck: Supple, with full range of motion. No jugular venous distention. Trachea midline. Respiratory:  Normal respiratory effort. Clear to auscultation, bilaterally without Rales/Wheezes/Rhonchi. Cardiovascular: Regular rate and rhythm with normal S1/S2 without murmurs, rubs or gallops. Abdomen: Soft, non-tender, non-distended with normal bowel sounds. Musculoskeletal:  edema bilaterally. Full range of motion without deformity. Skin:lower legs chronic skin changes, multiple bruises including face/arm    Neurologic:  Neurovascularly intact without any focal sensory/motor deficits.  Cranial nerves: II-XII intact, grossly non-focal.  Psychiatric: Alert
Infectious disease, progress note  This is a virtual visit done with the charge nurse, Felicitas Lewis and with approval of Patient    History of present illness: Follow up coag negative staph bacteremia and left lower extremity cellulitis on PO Keflex, well-tolerated  Patient has progressive clinical improvement with decreasing bilateral leg swelling, decreased left leg redness. Resolved dyspnea on exertion. +ve fatigue. Tolerating physical therapy   No fevers or chills   14 System review is negative otherwise     Physical exam :  Afebrile with stable vital signs   Normal respiratory effort   On RA  Alert and oriented x3  No rash  No abdominal distention   1-2+ B leg edema  Left leg with resolved erythema   Bilateral legs dressing    Labs were reviewed  Repeat blood cultures are negative     Assessment:  Coag negative Staph bacteremia, resolved  Left leg cellulitis. Improving.   Bilateral legs edema, better   Plan:  D/C home on keflex 500 mg PO TID for one week   Continue diuretics postD/C  F/U PRN    Matias Nelson MD
Infectious disease, progress note  This is a virtual visit done with the charge nurse, Rm Gaspar and with approval of Patient    History of present illness: Follow up coag negative staph bacteremia and left lower extremity cellulitis on IV cefazolin, well-tolerated  Patient has progressive clinical improvement with decreasing bilateral leg swelling , decreasing left leg redness. Resolved dyspnea on exertion. Has decreased fati kee. Tolerated physical therapy   No fevers or chills   14 System review is negative otherwise   Physical exam :  Afebrile with stable vital signs   Normal respiratory effort   On RA  Alert and oriented x3  No rash  No abdominal distention   1-2+ B leg edema  Left leg with mild erythema and warmth  Bilateral legs blisters     Labs were reviewed  Repeat blood cultures are negative     Assessment:  C oag negative Staph bacteremia, redolved  Left leg cellulitis. Improvement.   Bilateral legs edema   Plan:  Continue intervenous cefazolin till June 7 then switch to keflex 500 mg PO TID for one week   Continue diuretics and PT as ordered
Met with patient and family for weekly care conference, current diet remains appropriate and well tolerated . Intake appears adequate to meet estimated needs at this time. Pt dislikes ONS- Glucerna, reports only liking one particular flavor. Encouraged family to bring in if she prefers. Will d/c Glucerna. No d/c needs identified at this time. Current weight ordered for further assessment. RD to continue to monitor for duration of admission.
Occupational Therapy  Facility/Department: [facility] MED SURG UNIT  Daily Treatment Note  NAME: Michelle Godinez  : 1955  MRN: [id number]    Date of Service: 2023    Discharge Recommendations:  Continue to assess pending progress  OT Equipment Recommendations  Other: LB AE, tub bench      Patient Diagnosis(es): There were no encounter diagnoses. Assessment    Assessment: pt had already been assisted in clean up this day , but willing for toilet and grooming ADL ,with shampoo cap. demos competent STS and use of FWW , unable to reach feet , but donns underware with min A reviewed ECWS sheet for homemaking tasks  Activity Tolerance: Patient tolerated treatment well;Patient limited by endurance  Discharge Recommendations: Continue to assess pending progress  Other: LB AE, tub bench      Plan   Occupational Therapy Plan  Times Per Week: 4-7  Times Per Day: Once a day  Current Treatment Recommendations: Strengthening;ROM;Balance training;Functional mobility training; Endurance training; Safety education & training;Patient/Caregiver education & training;Self-Care / ADL;Equipment evaluation, education, & procurement;Home management training     Restrictions       Subjective   Subjective  Subjective: had clean up this AM ,performed toilet and grooming at sink           Objective    Vitals     Balance  Standing: With support (FWW)  Transfer Training  Transfer Training: Yes  Overall Level of Assistance: Stand-by assistance  Interventions: Verbal cues  Sit to Stand: Stand-by assistance  Stand to Sit: Stand-by assistance  Toilet Transfer: Stand-by assistance       ADL  Grooming: Stand by assistance;Setup  Grooming Skilled Clinical Factors: seated at sink stand as needed , applied shampoo cap , pt completed hair care and stand as needed for deoderant  UE Bathing: Stand by assistance;Setup  UE Dressing: Stand by assistance  LE Dressing: Maximum assistance  LE Dressing Skilled Clinical Factors: max for mayra socks , min for mayra
Physical Therapy  Facility/Department: [facility] MED SURG UNIT  Daily Treatment Note  NAME: Mariella Leroy  : 1955  MRN: [id number]    Date of Service: 2023    Discharge Recommendations:  Continue to assess pending progress, Home with assist PRN   PT Equipment Recommendations  Equipment Needed:  (needs further evaluation)  Other: Has FWW at home, denies having a cane. Suggest trying rollator for pt as she uses carts to lean on when shopping to give her greater freedom and a place to rest as needed. Patient Diagnosis(es): There were no encounter diagnoses. Assessment   Equipment Needed:  (needs further evaluation)  Other: Has FWW at home, denies having a cane. Suggest trying rollator for pt as she uses carts to lean on when shopping to give her greater freedom and a place to rest as needed. Plan    Physcial Therapy Plan  General Plan:  (1-2x/day, 5-7days/wk)  Current Treatment Recommendations: Strengthening;Balance training;Functional mobility training;Transfer training;Gait training;Stair training;Home exercise program;Safety education & training;Equipment evaluation, education, & procurement  Additional Comments: Expore use of rollator to give pt more freedom and a placme to rest if needed. Restrictions  Restrictions/Precautions  Restrictions/Precautions: Fall Risk, Bed Alarm  Required Braces or Orthoses?: No     Subjective    Subjective  Subjective: Pt. asleep in recliner snoring. Unable to maintain arrousal for greater than one minute. Declined PT session due to fatigue. Pain: 3/10 LLE  Orientation  Overall Orientation Status: Within Functional Limits  Orientation Level: Oriented X4  Cognition  Overall Cognitive Status: [facility]  Cognition Comment: Pt is very tired, falling asleep during intervention and had difficulty keeping eyes open. Objective   Vitals          Safety Devices  Type of Devices: Call light within reach; Left in chair;Chair alarm in place;Gait belt      Goals  Short Term
Physical Therapy  Facility/Department: [facility] MED SURG UNIT  Daily Treatment Note  NAME: Ayan Gary  : 1955  MRN: [id number]    Date of Service: 2023    Discharge Recommendations:  Continue to assess pending progress, Home with assist PRN   PT Equipment Recommendations  Equipment Needed:  (Needs further evaluation.)  Other: Has FWW at home, denies having a cane. Suggest trying rollator for pt as she uses carts to lean on when shopping to give her greater freedom and a place to rest as needed. Patient Diagnosis(es): There were no encounter diagnoses. Assessment   Assessment: (P) Pt. reports legs feel like jelly today. However, pt. was sitting up in chair at arrival. Assisted pt. to and from restroom for hygiene care. Pt. demo'd one moment with slight unsteadiness in restroom. Pt. followed with ambulation training with intermittent seated and stand rest breaks. 0 LOB. Added light resistance to B LE ther ex to assist with strengthening. Pt. Completed and tolerated seated core strengthening with available planes to assist with balance during function. Pt. agreeable to continue to sit up in chair post intervention. Activity Tolerance: (P) Patient tolerated treatment well;Patient limited by endurance     Plan    Physcial Therapy Plan  General Plan: (P) 5-7 times per week (1-2x a day)  Current Treatment Recommendations: (P) Strengthening;Balance training;Functional mobility training;Transfer training;Gait training;Stair training;Home exercise program;Safety education & training;Equipment evaluation, education, & procurement     Restrictions  Restrictions/Precautions  Restrictions/Precautions: Fall Risk, Bed Alarm  Required Braces or Orthoses?: No     Subjective    Subjective  Subjective: Pt. c/o that legs feel like jelly today.      Objective   Vitals     Balance  Standing: With support (FWW)  Transfer Training  Transfer Training: Yes  Overall Level of Assistance: Stand-by assistance  Interventions: Verbal
Physical Therapy  Facility/Department: [facility] MED SURG UNIT  Daily Treatment Note  NAME: Dung Bethea  : 1955  MRN: [id number]    Date of Service: 2023    Discharge Recommendations:  (P) Continue to assess pending progress, Home with assist PRN   PT Equipment Recommendations  Equipment Needed: (P)  (May deed further evaluation.)  Other: (P) Has FWW at home, denies having a cane. Suggest trying rollator for pt as she uses carts to lean on when shopping to give her greater freedom and a place to rest as needed. Patient Diagnosis(es): There were no encounter diagnoses. Assessment   Assessment: (P) Pt. tolerates ambulating a little farther distance this a.m. with reports of less distal LE pain /10. Pt. reports some difficulty with walking with tight bandages. Although, pt. amblated in hallway and than in room from surface to surface without LOB with using walker. Pt. may need further evaluation for appropriate equipment needed for saf home going. Pt. still needs occasional safety awareness cues during tsf's. No change with improving quailty of gait this a.m. with vc's. 0 LOB with slight lateral woddle with wide step width. Pt. tolerate 20 reps of seated B LE ther ex for ROM and strength. Pt. agreed to sit up for short time posty intervention with encouragement. Pt. c/o back and hips hurt if sitting up to long. Equipment Needed: (P)  (Needs further evaluation.)  Other: (P) Has FWW at home, denies having a cane. Suggest trying rollator for pt as she uses carts to lean on when shopping to give her greater freedom and a place to rest as needed.      Plan    Physcial Therapy Plan  General Plan: (P) 5-7 times per week  Current Treatment Recommendations: (P) Strengthening;Balance training;Functional mobility training;Transfer training;Gait training;Stair training;Home exercise program;Safety education & training;Equipment evaluation, education, & procurement
Physical Therapy  Facility/Department: [facility] MED SURG UNIT  Daily Treatment Note  NAME: Tico Burton  : 1955  MRN: [id number]    Date of Service: 2023    Discharge Recommendations:  Continue to assess pending progress, Home with assist PRN        Patient Diagnosis(es): There were no encounter diagnoses. Assessment   Assessment: (P) Pt. participated with therapy intervention this a.m. Pt. struggle with getting out of bed; however, able to complete with encouragement using assist rail with head of bed elevated 40 degrees. Although, pt. demo'd smooth controlled tsf skills with good technique and safety awareness with walker this morning. POt. tolerated gait and stair training without LOB. Wide step width with slow kyphotic posture demo'd using walker. Pt. able to ascend and descend 2 6 inch steps with use of B hand rail and contact guard assist for safety. Overall, pt. limited by complaints of distal LE pain. Pt. demo's swelling and redness with distal LE's. Assisted pt up in chair for meal time. Activity Tolerance: (P) Patient limited by fatigue;Patient limited by endurance     Plan    Physcial Therapy Plan  General Plan: (P) 5-7 times per week  Current Treatment Recommendations: (P) Strengthening;Balance training;Functional mobility training;Transfer training;Gait training;Stair training;Home exercise program;Safety education & training;Equipment evaluation, education, & procurement     Restrictions  Restrictions/Precautions  Restrictions/Precautions: Fall Risk, Bed Alarm  Required Braces or Orthoses?: No     Subjective    Subjective  Subjective: (P) Pt. in bed . Easily alert and cooperative. Pt. complains of swelling in B Legs with bubbles. Pt. reports left leg hurts more than right leg.      Objective   Vitals     Bed Mobility Training  Overall Level of Assistance: (P) Stand-by assistance  Interventions: (P) Safety awareness training;Verbal cues  Supine to Sit: (P) Stand-by assistance (Pt. demo's struggle
Physical Therapy  Facility/Department: [facility] MED SURG UNIT  Daily Treatment Note  NAME: Cecilio Blount  : 1955  MRN: [id number]    Date of Service: 2023    Discharge Recommendations:  Continue to assess pending progress, Home with assist PRN       Patient Diagnosis(es): There were no encounter diagnoses. Assessment   Assessment: (P) Pt. up in chair at arrival. Assist pt. to toilet with supervision level. Pt. agreed and followed with tolerating ambulating to training stairs and back to room without LOB. Pt. did complain and demo'd some difficulty with descending stairs due to c/o distal leg pain. Pt. also reported legs felt a little wobbly. Pt. able to make it safely back to room. Nurse notified for care with pain management of distal LE's. Pt. followed with tolerating well with B LE and trunk ther ex for strength and ROM. HEP handout educated and issued with LE ther ex to benefit with strength for home going. Activity Tolerance: (P) Patient tolerated treatment well;Patient limited by endurance     Plan    Physcial Therapy Plan  General Plan: (P) 5-7 times per week (1-2x a day.)  Current Treatment Recommendations: (P) Strengthening;Balance training;Functional mobility training;Transfer training;Gait training;Stair training;Home exercise program;Safety education & training;Equipment evaluation, education, & procurement     Restrictions  Restrictions/Precautions  Restrictions/Precautions: Fall Risk, Bed Alarm  Required Braces or Orthoses?: No     Subjective    Subjective  Subjective: (P) Pt. in chair requesting to get up to use restroom. Pt. c/o 9/10 leg and feet pain distally. Nurse notified on pt. Status for care.       Objective   Vitals     Transfer Training  Transfer Training: (P) Yes  Sit to Stand: (P) Supervision  Stand to Sit: (P) Stand-by assistance (Pt. demo's slight decrease controlled stand to sit with flop reporting I am sorry but I could not help it due to pain in feet.)  Toilet Transfer: (P)
Psychosocial Assessment     Physical Appearance: Average    Dress: Hospital attire    Hygiene: Fair    Speech: Coherent    Affect/Mood: Appropriate, Calm, and Cooperative    Alert and Orientated: Person and Place and situation     Thought Process: Relevant    Behavior: Cooperative    Resides:Patient resides in [place] independently    DME: Scott Esposito, [employer], and [facility]. Patient identifies no DME needs at this time     Support System: lives alone and Patient identifies adult son in [facility], My aunts live in [facility]  . Patient identifies family as supportive     History of Mental Health: Patient identities no past or current mental health issues     Suicidal/ Homicidal:  No    Food: Patient reports that she grocery shops and prepares meals. SS reviewed resources in [place] that will deliver groceries     Medication: [facility] in [place]     Transportation:Yes      Help at home needs: Patient reports interest in both skilled and non-skilled [place] [address] agencies. SS provided education on PASSPORT services. Patient reports interest in both PASSPORT and skilled [address] [place] [address] Avenue: Patient plans to [place] [address] upon DC    DC Recommendations:  SS to follow and monitor patient's progress for DC needs     Plan for transition of care is related to the following treatment goals: Patient reports that goal is, \" I'd love to be renuka to walk without the walker like I normally do. \"    The patient was provided with choice of provider, and agrees with the discharge plan: on-going      The patient was provided with choice of all post acute providers and agrees with the discharge plan: on-going     Electronically signed by GURPREET Sharma on 6/5/2023 at 1:12 PM
Pt has set bed alarm off multiple times without using call light for assistance to the restroom. Educated patient on the need for using calling light and the risks of fall. Pt verbalized understanding. Pt found out of bed with IV stretched across bed. Pt stated she was \"getting around to calling\". Reinforcing IV with tape. Avasys being placed for safety.
Pt has superficial skin tear on L forearm. Measures 4.3 cm length and 0.9 cm across. Wound was cleaned with NS and Xeroform placed on top with nonadherent bandage.      Komal Adler RN
Spiritual Care Services     Summary of Visit:  Pt comfortable today, just tired, sleeping off and on. Pt feeling confident in the care she is receiving. Emotional support provided. Encounter Summary  Encounter Overview/Reason : Spiritual/Emotional Needs  Service Provided For[de-identified] Patient  Referral/Consult From[de-identified] Rounding  Support System: Children, Family members  Last Encounter : 05/31/23  Complexity of Encounter: Low  Begin Time: 5600  End Time : 1435  Total Time Calculated: 20 min  Encounter   Type: Follow up     Spiritual/Emotional needs  Type: Spiritual Support      Spiritual Assessment/Intervention/Outcomes:    Assessment: Calm    Intervention: Active listening, Sustaining Presence/Ministry of presence    Outcome: Comfort      Care Plan:    Plan and Referrals  Plan/Referrals: Continue Support (comment)    Spiritual Care Services   Electronically signed by Sumi Hayes, [employer] on 6/5/2023 at 2:40 PM.    To reach a  for emotional and spiritual support, place an [facility] consult request.   If a  is needed immediately, dial 0 and ask to page the on-call .
This  met with patient and assisted patient in completing PASSPORT application. Patient thanked this  for assistance. Completed application faxed to [employer] on [employer]. Confirmation received that fax was successfully sent. This  included PASSPORT contact number on patient's MARTY and encouraged patient to follow up upon DC.   SS to continue to follow
Waqas going off this morning, when in room witnessed patient trying to turn the bed alarm off by herself, educated patient on why the bed alarm is on and why she has video monitoring. Assisted patient to the bathroom. Assessment completed, see documentation for details. Safety maintained at this time, bed is low and locked. Call light and belongings are in reach.
Wound consult placed, notified and communicated that wound nurse should be out tomorrow for legs.
Assistance: Stand-by assistance  Interventions: Safety awareness training;Verbal cues  Rolling: Modified independent (use of bed rail)  Supine to Sit: Stand-by assistance (use of bed rail HOB elevated approx 40 deg)  Sit to Supine: Stand-by assistance  Scooting: Minimum assistance (use of bed rails and bridging technique to scoot)  Balance  Sitting: Intact  Standing: With support (FWW used for standing and mobility tasks CGA/SBA)  Transfer Training  Transfer Training: Yes  Overall Level of Assistance: Stand-by assistance  Interventions:  (Better ability with transfers this date demoing proper handplacement without cues today)  Sit to Stand: Stand-by assistance  Stand to Sit: Stand-by assistance;Contact-guard assistance  Stand Pivot Transfers: Contact-guard assistance;Stand-by assistance  Toilet Transfer: Stand-by assistance  Gait Training  Gait Training: Yes  Gait  Overall Level of Assistance: Contact-guard assistance  Base of Support: Widened  Speed/Mariola: Pace decreased (< 100 feet/min)  Step Length: Left shortened;Right shortened  Gait Abnormalities: Antalgic (Downward gaze with kyphotic trunk.)  Distance (ft):  (10' x 2 functional distance to RR only due to cyst RLE RN encouraged to limit ambulation)  Assistive Device: Walker, rolling       PT Exercises  Exercise Treatment: supine BLE KATHYA AP x 20, GS x 20, SLR x 10 deferred hip abd and heelslides to reduce friction and comprimise cyst            Goals  Short Term Goals  Time Frame for Short Term Goals: 1-5 days- IV dependent  Short Term Goal 1: Supine to sit  out flat bed on left side of bed indep. without HR  Short Term Goal 2: amb >= 75ft with FFW with correct hand placement in transtions and safe use of walker and SBA  Short Term Goal 3: Perform active exercises BLE to promote strengtening and support functional mobility  Short Term Goal 4: Moniter pain level and O2 sat/pulse rate pre and post amb  Long Term Goals  Time Frame for Long Term Goals : 5-10days,
Cognitive Status: [facility]  Cognition Comment: Pt is very tired, falling asleep during intervention and had difficulty keeping eyes open. Objective    Bed Mobility Training  Bed Mobility Training: No  Balance  Sitting: Intact  Standing: With support (FWW used for standing and mobility tasks CGA/SBA)  Transfer Training  Transfer Training: Yes  Overall Level of Assistance: Stand-by assistance;Contact-guard assistance (With FWW, min vc for walker placement and transfer safety)  Interventions: Verbal cues; Safety awareness training  Sit to Stand: Stand-by assistance;Contact-guard assistance  Stand to Sit: Stand-by assistance;Contact-guard assistance  Toilet Transfer: Stand-by assistance       ADL  Grooming: Stand by assistance  UE Bathing: Setup;Stand by assistance  LE Bathing: Minimal assistance;Setup  UE Dressing: Minimal assistance  UE Dressing Skilled Clinical Factors: OT assisted with hospital gown d/t IV and ties  LE Dressing: Dependent/Total;Maximum assistance  LE Dressing Skilled Clinical Factors: Attempted to remove and mayra socks, unable to reach and was limited by edema in BLE. Recommended hip kit for pt to purchase to improve independence and safety, pt does not own any LB AE. Toileting: Stand by assistance  Additional Comments: Sponge bath completed in stance and seated in-front of sink, attempted to have pt use shower however, IV was running and IV site was becoming loose. RN made aware who secured area and could not stop IV at this time d/t pt needed the medication. Sponge bath completed instead of full shower. Pt has tub shower at home and will need tub bench. Pt was unable to safely reach her feet for dressing and bathing- recommended hip kit and educated pt on where to purchase. Pt does not have her own clothing here- OT educated pt to please have family or friends bring in a change of clothes for ADL retraining.   OT Exercises  Functional Mobility Circuit Training: Functional mobility
Pt. Was agreeable to bathing/dressing with OT sitting EOB. Objective    Vitals: O2 94%           ADL  Grooming: Stand by assistance;Supervision  Grooming Skilled Clinical Factors: after s/u sitting EOB  UE Bathing: Setup;Stand by assistance  UE Bathing Skilled Clinical Factors: Increased time and cues for sequencing  LE Bathing: Moderate assistance  LE Bathing Skilled Clinical Factors: difficulty reaching (B) feet (ensuring backside cleanliness)  UE Dressing: Stand by assistance  UE Dressing Skilled Clinical Factors: Pt. was not hooked up to IVs  LE Dressing: Maximum assistance  LE Dressing Skilled Clinical Factors: changing socks, removing underwear  Additional Comments: sponge bath sitting EOB      Sit to stands 3 x -CGA/SBA with vc  Pt. Tolerated standing for 4 minutes with ww while cleaning LB, 1 minute 1 x, 2 minutes 1 x for static standing holding onto ww    Trained and educated pt. In energy conservation techniques to decrease fatigue  Gave pt.  Recommendations for dressing techniques and home management techniques         Goals  Short Term Goals  Time Frame for Short Term Goals: 1-5 days  Short Term Goal 1: complete UB dressing/bathing tasks w/ Mod I  Short Term Goal 2: complete toileting tasks w/ MI  Short Term Goal 3: complete grooming/hygiene tasks standing at sink w/ MI  Long Term Goals  Time Frame for Long Term Goals : 5-10  Long Term Goal 1: Complete light housekeeping/kitchen mobility w/ SBA  Long Term Goal 2: Complete LB dressing tasks w/ AE as needed w/ MI  Long Term Goal 3: complete tub transfer w/ CGA       Therapy Time   Individual Concurrent Group Co-treatment   Time In  9:00 am         Time Out  10:00 am         Minutes  [address] [facility] [address]
elevated. Ordered DM diet (4 carbs/meal), HALLIE. Last BM 6/3 per EMR. Wound Type: Open Wounds (cellulitis)       Current Nutrition Intake & Therapies:    Average Meal Intake: %  Average Supplements Intake: None Ordered  ADULT DIET; Regular; 4 carb choices (60 gm/meal); No Added Salt (3-4 gm)    Anthropometric Measures:  Height: 5' 1\" (154.9 cm)  Ideal Body Weight (IBW): 105 lbs (48 kg)    Admission Body Weight: 223 lb (101.2 kg)  Current Body Weight: 223 lb 1.7 oz (101.2 kg), 212.4 % IBW.  Weight Source: Not Specified  Current BMI (kg/m2): 42.2  Usual Body Weight: 220 lb (99.8 kg) (12/22/22)  % Weight Change (Calculated): 1.4  BMI Categories: Obese Class 3 (BMI 40.0 or greater)    Estimated Daily Nutrient Needs:  Energy Requirements Based On: Kcal/kg     Energy (kcal/day): 2932-9231 (11-14kcal/kg)  Weight Used for Protein Requirements: Ideal  Protein (g/day): 96gm  Method Used for Fluid Requirements: 1 ml/kcal  Fluid (ml/day): 7663-4859    Nutrition Diagnosis:   Altered nutrition-related lab values related to endocrine dysfuntion, renal dysfunction as evidenced by lab values, localized or generalized fluid accumulation    Nutrition Interventions:   Food and/or Nutrient Delivery: Continue Current Diet, Discontinue Oral Nutrition Supplement  Nutrition Education/Counseling: Education declined  Coordination of Nutrition Care: Continue to monitor while inpatient       Goals:     Goals: PO intake 75% or greater       Nutrition Monitoring and Evaluation:      Food/Nutrient Intake Outcomes: Food and Nutrient Intake  Physical Signs/Symptoms Outcomes: Biochemical Data, Meal Time Behavior, Weight    Discharge Planning:    Continue current diet     Jatin Monet RD
was able to tolerate sitting up in recliner ~ 90 minutes until back and hips hurt 8/10. Objective   Vitals     Bed Mobility Training  Supine to Sit: (P) Stand-by assistance  Sit to Supine: (P) Stand-by assistance  Transfer Training  Interventions: (P) Verbal cues; Safety awareness training (hand placement for UE push off and reach to sit. Pt. tends to want to keep hands on walker during tsf's.)  Sit to Stand: (P) Stand-by assistance  Stand to Sit: (P) Stand-by assistance;Contact-guard assistance  Toilet Transfer: Stand-by assistance  Gait Training  Gait Training: Yes  Gait  Overall Level of Assistance: (P) Contact-guard assistance;Stand-by assistance  Base of Support: (P) Widened  Speed/Mariola: (P) Pace decreased (< 100 feet/min)  Step Length: (P) Left shortened;Right shortened  Gait Abnormalities: (P) Antalgic (mild wobble with slight unsteadiness with tight maneuverability with turns and side step to get into bed.)  Distance (ft):  (65'x1 in hallway, 20'x2 from surface to surface in room.)  Assistive Device: (P) Walker, rolling     Other Specialty Interventions  Other Treatments/Modalities: (P) Pt. edcuated to encourage pt. to sit up in chair daily, improve functional upright activity tolerance to improve well being recovery. Goals  Short Term Goals  Time Frame for Short Term Goals: 1-5 days- IV dependent  Short Term Goal 1: Supine to sit  out flat bed on left side of bed indep. without HR  Short Term Goal 2: amb >= 75ft with FFW with correct hand placement in transtions and safe use of walker and SBA  Short Term Goal 3: Perform active exercises BLE to promote strengtening and support functional mobility  Short Term Goal 4: Moniter pain level and O2 sat/pulse rate pre and post amb  Long Term Goals  Time Frame for Long Term Goals : 5-10days, d/c medically dependent  Long Term Goal 1: Indep, safe transitions with appropriate AD from bed to standing.   Long Term Goal 2: Amb with appropriate AD greater
exercises BLE to promote strengtening and support functional mobility  Short Term Goal 4: Moniter pain level and O2 sat/pulse rate pre and post amb  Long Term Goals  Time Frame for Long Term Goals : 5-10days, d/c medically dependent  Long Term Goal 1: Indep, safe transitions with appropriate AD from bed to standing.   Long Term Goal 2: Amb with appropriate AD greater than 76' with step through gait and no LOB and moderate pace  Long Term Goal 3: Participate in strengthening, balance and ROM ex 100% of the time  Long Term Goal 4: Perform one step with  hold  indep  Long Term Goal 5: Written HEP  Patient Goals   Patient Goals : Return home    Education       Therapy Time   Individual Concurrent Group Co-treatment   Time In  130         Time Out  [address]         Minutes  [id number]
pace  Long Term Goal 3: Participate in strengthening, balance and ROM ex 100% of the time  Long Term Goal 4: Perform one step with  hold  indep  Long Term Goal 5: Written HEP  Patient Goals   Patient Goals : Return home    Education  Patient Education  Education Given To: Patient  Education Provided: Plan of Care;Transfer Training;Equipment  Education Provided Comments: hand palacement, ant WS, safe transitions with [employer]  Education Method: Verbal  Education Outcome: Continued education needed    Therapy Time   Individual Concurrent Group Co-treatment   Time In 2810         Time Out 1100         Minutes [address] [place] [address]
skilled status at [facility] signed by Lupis Velasquez MD on 6/9/2023 at 9:20 AM
discharge: skilled nursing    Patient appropriate for Outpatient [address]: N/A    Referrals:  []   [] [facility]  [] Supplies  [] Other    Patient/Caregiver Teaching:  Level of patient/caregiver understanding able to:   [] Indicates understanding       [] Needs reinforcement  [] Unsuccessful      [] Verbal Understanding  [] Demonstrated understanding       [] No evidence of learning  [] Refused teaching         [x] N/A       Electronically signed by Trine Kocher, BSN, RN, Ginnyelana Deleon on 6/6/2023 at 2:11 PM

## 2023-06-10 LAB
GLUCOSE BLD-MCNC: 103 MG/DL (ref 70–99)
GLUCOSE BLD-MCNC: 134 MG/DL (ref 70–99)
PERFORMED ON: ABNORMAL
PERFORMED ON: ABNORMAL

## 2023-06-10 PROCEDURE — 6370000000 HC RX 637 (ALT 250 FOR IP): Performed by: INTERNAL MEDICINE

## 2023-06-10 RX ORDER — FERROUS SULFATE 325(65) MG
325 TABLET ORAL 2 TIMES DAILY WITH MEALS
Qty: 60 TABLET | Refills: 0 | Status: SHIPPED | OUTPATIENT
Start: 2023-06-10

## 2023-06-10 RX ORDER — CEPHALEXIN 500 MG/1
500 CAPSULE ORAL EVERY 8 HOURS SCHEDULED
Qty: 21 CAPSULE | Refills: 0 | Status: SHIPPED | OUTPATIENT
Start: 2023-06-10 | End: 2023-06-17

## 2023-06-10 RX ADMIN — LEVOTHYROXINE SODIUM 125 MCG: 0.1 TABLET ORAL at 05:33

## 2023-06-10 RX ADMIN — PANTOPRAZOLE SODIUM 40 MG: 40 TABLET, DELAYED RELEASE ORAL at 05:33

## 2023-06-10 RX ADMIN — FERROUS SULFATE TAB 325 MG (65 MG ELEMENTAL FE) 325 MG: 325 (65 FE) TAB at 08:06

## 2023-06-10 RX ADMIN — FUROSEMIDE 40 MG: 40 TABLET ORAL at 08:06

## 2023-06-10 RX ADMIN — CEPHALEXIN 500 MG: 500 CAPSULE ORAL at 05:33

## 2023-06-15 ENCOUNTER — TELEPHONE (OUTPATIENT)
Dept: INTERNAL MEDICINE | Age: 68
End: 2023-06-15

## 2023-07-27 ENCOUNTER — OFFICE VISIT (OUTPATIENT)
Dept: INTERNAL MEDICINE | Age: 68
End: 2023-07-27
Payer: MEDICARE

## 2023-07-27 VITALS
SYSTOLIC BLOOD PRESSURE: 108 MMHG | TEMPERATURE: 97.2 F | DIASTOLIC BLOOD PRESSURE: 64 MMHG | RESPIRATION RATE: 16 BRPM | BODY MASS INDEX: 40.67 KG/M2 | HEIGHT: 61 IN | WEIGHT: 215.4 LBS | HEART RATE: 77 BPM | OXYGEN SATURATION: 98 %

## 2023-07-27 DIAGNOSIS — E11.22 TYPE 2 DIABETES MELLITUS WITH STAGE 3 CHRONIC KIDNEY DISEASE, WITH LONG-TERM CURRENT USE OF INSULIN, UNSPECIFIED WHETHER STAGE 3A OR 3B CKD (HCC): ICD-10-CM

## 2023-07-27 DIAGNOSIS — R27.0 ATAXIA: ICD-10-CM

## 2023-07-27 DIAGNOSIS — C92.10 CML (CHRONIC MYELOID LEUKEMIA) (HCC): ICD-10-CM

## 2023-07-27 DIAGNOSIS — D68.9 COAGULATION DEFECT (HCC): ICD-10-CM

## 2023-07-27 DIAGNOSIS — Z79.4 TYPE 2 DIABETES MELLITUS WITH STAGE 3 CHRONIC KIDNEY DISEASE, WITH LONG-TERM CURRENT USE OF INSULIN, UNSPECIFIED WHETHER STAGE 3A OR 3B CKD (HCC): ICD-10-CM

## 2023-07-27 DIAGNOSIS — N18.32 STAGE 3B CHRONIC KIDNEY DISEASE (HCC): Primary | ICD-10-CM

## 2023-07-27 DIAGNOSIS — R60.0 EDEMA OF BOTH LOWER EXTREMITIES: ICD-10-CM

## 2023-07-27 DIAGNOSIS — N18.30 TYPE 2 DIABETES MELLITUS WITH STAGE 3 CHRONIC KIDNEY DISEASE, WITH LONG-TERM CURRENT USE OF INSULIN, UNSPECIFIED WHETHER STAGE 3A OR 3B CKD (HCC): ICD-10-CM

## 2023-07-27 DIAGNOSIS — E66.01 OBESITY, CLASS III, BMI 40-49.9 (MORBID OBESITY) (HCC): ICD-10-CM

## 2023-07-27 PROBLEM — N17.9 ACUTE KIDNEY INJURY SUPERIMPOSED ON CKD (HCC): Status: RESOLVED | Noted: 2023-06-05 | Resolved: 2023-07-27

## 2023-07-27 PROBLEM — F33.9 MAJOR DEPRESSIVE DISORDER, RECURRENT, UNSPECIFIED (HCC): Status: RESOLVED | Noted: 2023-07-27 | Resolved: 2023-07-27

## 2023-07-27 PROBLEM — F33.0 MAJOR DEPRESSIVE DISORDER, RECURRENT, MILD (HCC): Status: ACTIVE | Noted: 2023-07-27

## 2023-07-27 PROBLEM — Z86.39 HISTORY OF DIABETES MELLITUS: Status: RESOLVED | Noted: 2021-06-10 | Resolved: 2023-07-27

## 2023-07-27 PROBLEM — E11.9 DIABETES MELLITUS TYPE 2, INSULIN DEPENDENT (HCC): Status: RESOLVED | Noted: 2021-06-23 | Resolved: 2023-07-27

## 2023-07-27 PROBLEM — F33.0 MAJOR DEPRESSIVE DISORDER, RECURRENT, MILD (HCC): Status: RESOLVED | Noted: 2023-07-27 | Resolved: 2023-07-27

## 2023-07-27 PROBLEM — N18.4 CKD (CHRONIC KIDNEY DISEASE) STAGE 4, GFR 15-29 ML/MIN (HCC): Status: RESOLVED | Noted: 2022-07-13 | Resolved: 2023-07-27

## 2023-07-27 PROBLEM — L03.90 CELLULITIS: Status: RESOLVED | Noted: 2023-05-31 | Resolved: 2023-07-27

## 2023-07-27 PROBLEM — F33.9 MAJOR DEPRESSIVE DISORDER, RECURRENT, UNSPECIFIED (HCC): Status: ACTIVE | Noted: 2023-07-27

## 2023-07-27 PROBLEM — F33.1 MAJOR DEPRESSIVE DISORDER, RECURRENT, MODERATE (HCC): Status: RESOLVED | Noted: 2023-07-27 | Resolved: 2023-07-27

## 2023-07-27 PROBLEM — B35.1 NAIL FUNGUS: Status: RESOLVED | Noted: 2021-06-10 | Resolved: 2023-07-27

## 2023-07-27 PROBLEM — F33.1 MAJOR DEPRESSIVE DISORDER, RECURRENT, MODERATE (HCC): Status: ACTIVE | Noted: 2023-07-27

## 2023-07-27 PROBLEM — N18.9 ACUTE KIDNEY INJURY SUPERIMPOSED ON CKD (HCC): Status: RESOLVED | Noted: 2023-06-05 | Resolved: 2023-07-27

## 2023-07-27 LAB — HBA1C MFR BLD: 4.8 %

## 2023-07-27 PROCEDURE — 3074F SYST BP LT 130 MM HG: CPT | Performed by: PHYSICIAN ASSISTANT

## 2023-07-27 PROCEDURE — 2022F DILAT RTA XM EVC RTNOPTHY: CPT | Performed by: PHYSICIAN ASSISTANT

## 2023-07-27 PROCEDURE — 1123F ACP DISCUSS/DSCN MKR DOCD: CPT | Performed by: PHYSICIAN ASSISTANT

## 2023-07-27 PROCEDURE — 83037 HB GLYCOSYLATED A1C HOME DEV: CPT | Performed by: PHYSICIAN ASSISTANT

## 2023-07-27 PROCEDURE — G8417 CALC BMI ABV UP PARAM F/U: HCPCS | Performed by: PHYSICIAN ASSISTANT

## 2023-07-27 PROCEDURE — 99214 OFFICE O/P EST MOD 30 MIN: CPT | Performed by: PHYSICIAN ASSISTANT

## 2023-07-27 PROCEDURE — G8427 DOCREV CUR MEDS BY ELIG CLIN: HCPCS | Performed by: PHYSICIAN ASSISTANT

## 2023-07-27 PROCEDURE — 3078F DIAST BP <80 MM HG: CPT | Performed by: PHYSICIAN ASSISTANT

## 2023-07-27 PROCEDURE — 3017F COLORECTAL CA SCREEN DOC REV: CPT | Performed by: PHYSICIAN ASSISTANT

## 2023-07-27 PROCEDURE — G8400 PT W/DXA NO RESULTS DOC: HCPCS | Performed by: PHYSICIAN ASSISTANT

## 2023-07-27 PROCEDURE — 1090F PRES/ABSN URINE INCON ASSESS: CPT | Performed by: PHYSICIAN ASSISTANT

## 2023-07-27 PROCEDURE — 3044F HG A1C LEVEL LT 7.0%: CPT | Performed by: PHYSICIAN ASSISTANT

## 2023-07-27 PROCEDURE — 1036F TOBACCO NON-USER: CPT | Performed by: PHYSICIAN ASSISTANT

## 2023-07-27 RX ORDER — INSULIN GLARGINE 100 [IU]/ML
20 INJECTION, SOLUTION SUBCUTANEOUS NIGHTLY
Qty: 15 ML | Refills: 0 | Status: SHIPPED | OUTPATIENT
Start: 2023-07-27

## 2023-07-27 SDOH — ECONOMIC STABILITY: HOUSING INSECURITY
IN THE LAST 12 MONTHS, WAS THERE A TIME WHEN YOU DID NOT HAVE A STEADY PLACE TO SLEEP OR SLEPT IN A SHELTER (INCLUDING NOW)?: NO

## 2023-07-27 SDOH — ECONOMIC STABILITY: INCOME INSECURITY: HOW HARD IS IT FOR YOU TO PAY FOR THE VERY BASICS LIKE FOOD, HOUSING, MEDICAL CARE, AND HEATING?: NOT HARD AT ALL

## 2023-07-27 SDOH — ECONOMIC STABILITY: FOOD INSECURITY: WITHIN THE PAST 12 MONTHS, YOU WORRIED THAT YOUR FOOD WOULD RUN OUT BEFORE YOU GOT MONEY TO BUY MORE.: NEVER TRUE

## 2023-07-27 SDOH — ECONOMIC STABILITY: FOOD INSECURITY: WITHIN THE PAST 12 MONTHS, THE FOOD YOU BOUGHT JUST DIDN'T LAST AND YOU DIDN'T HAVE MONEY TO GET MORE.: NEVER TRUE

## 2023-07-27 ASSESSMENT — PATIENT HEALTH QUESTIONNAIRE - PHQ9
SUM OF ALL RESPONSES TO PHQ QUESTIONS 1-9: 0
2. FEELING DOWN, DEPRESSED OR HOPELESS: 0
SUM OF ALL RESPONSES TO PHQ QUESTIONS 1-9: 0
SUM OF ALL RESPONSES TO PHQ QUESTIONS 1-9: 0
1. LITTLE INTEREST OR PLEASURE IN DOING THINGS: 0
SUM OF ALL RESPONSES TO PHQ9 QUESTIONS 1 & 2: 0
SUM OF ALL RESPONSES TO PHQ QUESTIONS 1-9: 0

## 2023-07-27 NOTE — PROGRESS NOTES
Ashley Soni (: 1955) is a 70 y.o. female, Established patient, here for evaluation of the following chief complaint(s):  Diabetes (Pt is not fasting. No concerns. PT has outstanding labs but is not fasting. She is aware of the hours and days of the lab./) and Other (Pt son would like information on healthy eating. They are requesting a handicap placard/ /History of Hep C was gone and came back after having procedures in the hospital.  /Has skin cancer on the nose.  )        ASSESSMENT/PLAN:  1. Stage 3b chronic kidney disease ([address])    2. Coagulation defect ([address])    3. CML (chronic myeloid leukemia) ([address])    4. Type 2 diabetes mellitus with stage 3 chronic kidney disease, with long-term current use of insulin, unspecified whether stage 3a or 3b CKD ([address])    5. Ataxia  - Handicap Placard MISC; by Does not apply route  Dispense: 1 each; Refill: 0    6. Edema of both lower extremities  - Elastic Bandages & Supports (MEDICAL COMPRESSION STOCKINGS) MISC; Wear daily while awake only medium compression 150 mm  Dispense: 1 each; Refill: 0    7. Obesity, Class III, BMI 40-49.9 (morbid obesity) ([facility])  Obesity Counseling: Assessed behavioral health risks and factors affecting choice of behavior. Suggested weight control approaches, including dietary changes behavioral modification and follow up plan. Provided educational and support documentation. Time spent (minutes): 15          No follow-ups on file. SUBJECTIVE/OBJECTIVE:  HPI    Diabetes Mellitus Type 2:   Medication compliance:  compliant all of the time  Current medication regimen: lantus 20 units HS. Humalog 8 units with meals   Diet compliance:  compliant most of the time, she does eat bread   Weight trend: stable  Current exercise: no regular exercise  Current symptoms/problems include none. Home blood sugar records:  fasting range: low 100's   Any episodes of hypoglycemia? no  Tobacco history: She  reports that she has never smoked.  She has never

## 2023-08-17 ENCOUNTER — TELEPHONE (OUTPATIENT)
Dept: INTERNAL MEDICINE | Age: 68
End: 2023-08-17

## 2023-08-17 ENCOUNTER — TELEPHONE (OUTPATIENT)
Dept: GASTROENTEROLOGY | Age: 68
End: 2023-08-17

## 2023-08-17 DIAGNOSIS — E03.9 ACQUIRED HYPOTHYROIDISM: ICD-10-CM

## 2023-08-17 NOTE — TELEPHONE ENCOUNTER
Comments:     Last Office Visit (last PCP visit):   7/27/2023    Next Visit Date:  No future appointments. **If hasn't been seen in over a year OR hasn't followed up according to last diabetes/ADHD visit, make appointment for patient before sending refill to provider.     Rx requested:  Requested Prescriptions     Pending Prescriptions Disp Refills    levothyroxine (SYNTHROID) 125 MCG tablet [Pharmacy Med Name: LEVOTHYROXINE 125 MCG TABLET] 90 tablet 1     Sig: take 1 tablet by mouth once daily

## 2023-08-21 RX ORDER — LEVOTHYROXINE SODIUM 0.12 MG/1
TABLET ORAL
Qty: 90 TABLET | Refills: 1 | Status: SHIPPED | OUTPATIENT
Start: 2023-08-21

## 2023-09-22 DIAGNOSIS — G47.00 INSOMNIA, UNSPECIFIED TYPE: ICD-10-CM

## 2023-09-22 NOTE — TELEPHONE ENCOUNTER
Comments:     Last Office Visit (last PCP visit):   7/27/2023    Next Visit Date:  No future appointments. **If hasn't been seen in over a year OR hasn't followed up according to last diabetes/ADHD visit, make appointment for patient before sending refill to provider.     Rx requested:  Requested Prescriptions     Pending Prescriptions Disp Refills    doxepin (SINEQUAN) 25 MG capsule [Pharmacy Med Name: DOXEPIN 25 MG CAPSULE] 90 capsule 0     Sig: take 1 capsule by mouth nightly

## 2023-09-23 RX ORDER — DOXEPIN HYDROCHLORIDE 25 MG/1
25 CAPSULE ORAL NIGHTLY
Qty: 90 CAPSULE | Refills: 0 | Status: SHIPPED | OUTPATIENT
Start: 2023-09-23

## 2023-10-04 DIAGNOSIS — I10 ESSENTIAL HYPERTENSION: ICD-10-CM

## 2023-10-04 NOTE — TELEPHONE ENCOUNTER
Comments:     Last Office Visit (last PCP visit):   7/27/2023    Next Visit Date:  No future appointments. **If hasn't been seen in over a year OR hasn't followed up according to last diabetes/ADHD visit, make appointment for patient before sending refill to provider.     Rx requested:  Requested Prescriptions     Pending Prescriptions Disp Refills    torsemide (DEMADEX) 20 MG tablet [Pharmacy Med Name: TORSEMIDE 20 MG TABLET] 90 tablet 1     Sig: take 1 tablet by mouth once daily    lisinopril (PRINIVIL;ZESTRIL) 20 MG tablet [Pharmacy Med Name: LISINOPRIL 20 MG TABLET] 90 tablet 1     Sig: take 1 tablet by mouth once daily

## 2023-10-05 RX ORDER — TORSEMIDE 20 MG/1
20 TABLET ORAL DAILY
Qty: 90 TABLET | Refills: 1 | Status: SHIPPED | OUTPATIENT
Start: 2023-10-05

## 2023-10-05 RX ORDER — LISINOPRIL 20 MG/1
TABLET ORAL
Qty: 90 TABLET | Refills: 1 | Status: SHIPPED | OUTPATIENT
Start: 2023-10-05

## 2023-10-06 DIAGNOSIS — N18.30 TYPE 2 DIABETES MELLITUS WITH STAGE 3 CHRONIC KIDNEY DISEASE, WITH LONG-TERM CURRENT USE OF INSULIN, UNSPECIFIED WHETHER STAGE 3A OR 3B CKD (HCC): ICD-10-CM

## 2023-10-06 DIAGNOSIS — Z79.4 DIABETES MELLITUS TYPE 2, INSULIN DEPENDENT (HCC): ICD-10-CM

## 2023-10-06 DIAGNOSIS — E11.9 DIABETES MELLITUS TYPE 2, INSULIN DEPENDENT (HCC): ICD-10-CM

## 2023-10-06 DIAGNOSIS — E11.22 TYPE 2 DIABETES MELLITUS WITH STAGE 3 CHRONIC KIDNEY DISEASE, WITH LONG-TERM CURRENT USE OF INSULIN, UNSPECIFIED WHETHER STAGE 3A OR 3B CKD (HCC): ICD-10-CM

## 2023-10-06 DIAGNOSIS — Z79.4 TYPE 2 DIABETES MELLITUS WITH STAGE 3 CHRONIC KIDNEY DISEASE, WITH LONG-TERM CURRENT USE OF INSULIN, UNSPECIFIED WHETHER STAGE 3A OR 3B CKD (HCC): ICD-10-CM

## 2023-10-06 RX ORDER — INSULIN GLARGINE 100 [IU]/ML
20 INJECTION, SOLUTION SUBCUTANEOUS
Qty: 15 ML | Refills: 0 | Status: SHIPPED | OUTPATIENT
Start: 2023-10-06

## 2023-10-06 NOTE — TELEPHONE ENCOUNTER
Comments:     Last Office Visit (last PCP visit):   7/27/2023    Next Visit Date:  No future appointments. **If hasn't been seen in over a year OR hasn't followed up according to last diabetes/ADHD visit, make appointment for patient before sending refill to provider.     Rx requested:  Requested Prescriptions     Pending Prescriptions Disp Refills    LANTUS SOLOSTAR 100 UNIT/ML injection pen [Pharmacy Med Name: LANTUS SOLOSTAR 100 UNIT/ML] 15 mL 0     Sig: inject 20 units subcutaneously nightly

## 2023-10-07 NOTE — TELEPHONE ENCOUNTER
Comments:     Last Office Visit (last PCP visit):   7/27/2023    Next Visit Date:  No future appointments. **If hasn't been seen in over a year OR hasn't followed up according to last diabetes/ADHD visit, make appointment for patient before sending refill to provider.     Rx requested:  Requested Prescriptions     Pending Prescriptions Disp Refills    insulin lispro, 1 Unit Dial, (HUMALOG KWIKPEN) 100 UNIT/ML SOPN [Pharmacy Med Name: HUMALOG 100 UNIT/ML KWIKPEN] 15 mL 1     Sig: inject 7 units subcutaneously three times a day before meals

## 2023-10-08 DIAGNOSIS — Z12.31 VISIT FOR SCREENING MAMMOGRAM: Primary | ICD-10-CM

## 2023-10-10 RX ORDER — INSULIN LISPRO 100 [IU]/ML
INJECTION, SOLUTION INTRAVENOUS; SUBCUTANEOUS
Qty: 15 ML | Refills: 1 | Status: SHIPPED | OUTPATIENT
Start: 2023-10-10

## 2023-10-27 ENCOUNTER — TELEPHONE (OUTPATIENT)
Dept: INTERNAL MEDICINE | Age: 68
End: 2023-10-27

## 2023-11-18 DIAGNOSIS — E11.22 TYPE 2 DIABETES MELLITUS WITH STAGE 3 CHRONIC KIDNEY DISEASE, WITH LONG-TERM CURRENT USE OF INSULIN, UNSPECIFIED WHETHER STAGE 3A OR 3B CKD (HCC): ICD-10-CM

## 2023-11-18 DIAGNOSIS — Z79.4 TYPE 2 DIABETES MELLITUS WITH STAGE 3 CHRONIC KIDNEY DISEASE, WITH LONG-TERM CURRENT USE OF INSULIN, UNSPECIFIED WHETHER STAGE 3A OR 3B CKD (HCC): ICD-10-CM

## 2023-11-18 DIAGNOSIS — N18.30 TYPE 2 DIABETES MELLITUS WITH STAGE 3 CHRONIC KIDNEY DISEASE, WITH LONG-TERM CURRENT USE OF INSULIN, UNSPECIFIED WHETHER STAGE 3A OR 3B CKD (HCC): ICD-10-CM

## 2023-11-20 NOTE — TELEPHONE ENCOUNTER
Comments:     Last Office Visit (last PCP visit):   7/27/2023    Next Visit Date:  No future appointments.    **If hasn't been seen in over a year OR hasn't followed up according to last diabetes/ADHD visit, make appointment for patient before sending refill to provider.    Rx requested:  Requested Prescriptions     Pending Prescriptions Disp Refills    LANTUS SOLOSTAR 100 UNIT/ML injection pen [Pharmacy Med Name: LANTUS SOLOSTAR PEN 15ML 100 INSU] 15 mL 10     Sig: INJECT 20 UNITS SUBCUTANEOUSLY NIGHTLY

## 2023-11-21 RX ORDER — INSULIN GLARGINE 100 [IU]/ML
20 INJECTION, SOLUTION SUBCUTANEOUS
Qty: 15 ML | Refills: 0 | Status: SHIPPED | OUTPATIENT
Start: 2023-11-21 | End: 2023-12-07

## 2023-12-05 DIAGNOSIS — Z79.4 TYPE 2 DIABETES MELLITUS WITH STAGE 3 CHRONIC KIDNEY DISEASE, WITH LONG-TERM CURRENT USE OF INSULIN, UNSPECIFIED WHETHER STAGE 3A OR 3B CKD (HCC): ICD-10-CM

## 2023-12-05 DIAGNOSIS — N18.30 TYPE 2 DIABETES MELLITUS WITH STAGE 3 CHRONIC KIDNEY DISEASE, WITH LONG-TERM CURRENT USE OF INSULIN, UNSPECIFIED WHETHER STAGE 3A OR 3B CKD (HCC): ICD-10-CM

## 2023-12-05 DIAGNOSIS — E11.22 TYPE 2 DIABETES MELLITUS WITH STAGE 3 CHRONIC KIDNEY DISEASE, WITH LONG-TERM CURRENT USE OF INSULIN, UNSPECIFIED WHETHER STAGE 3A OR 3B CKD (HCC): ICD-10-CM

## 2023-12-07 RX ORDER — INSULIN GLARGINE 100 [IU]/ML
20 INJECTION, SOLUTION SUBCUTANEOUS
Qty: 15 ML | Refills: 10 | Status: SHIPPED | OUTPATIENT
Start: 2023-12-07

## 2023-12-12 DIAGNOSIS — Z79.4 DIABETES MELLITUS TYPE 2, INSULIN DEPENDENT (HCC): ICD-10-CM

## 2023-12-12 DIAGNOSIS — E11.9 DIABETES MELLITUS TYPE 2, INSULIN DEPENDENT (HCC): ICD-10-CM

## 2023-12-14 RX ORDER — PEN NEEDLE, DIABETIC 32 GX 1/4"
NEEDLE, DISPOSABLE MISCELLANEOUS
Qty: 100 EACH | Refills: 10 | Status: SHIPPED | OUTPATIENT
Start: 2023-12-14

## 2023-12-14 NOTE — TELEPHONE ENCOUNTER
Comments:     Last Office Visit (last PCP visit):   7/27/2023    Next Visit Date:  No future appointments. **If hasn't been seen in over a year OR hasn't followed up according to last diabetes/ADHD visit, make appointment for patient before sending refill to provider.     Rx requested:  Requested Prescriptions     Pending Prescriptions Disp Refills    EASY TOUCH PEN NEEDLES 32G X 6 MM MISC [Pharmacy Med Name: Sharri Goldberg PEN 32GX1/4\" 100 28 [address] 1/4\" NEDL]  10     Sig: USE 1 NEEDLE TO INJECT MEDICATION SUBCUTANEOUSLY FOUR TIMES A DAY

## 2024-01-11 DIAGNOSIS — I10 ESSENTIAL HYPERTENSION: ICD-10-CM

## 2024-01-11 DIAGNOSIS — G47.00 INSOMNIA, UNSPECIFIED TYPE: ICD-10-CM

## 2024-01-12 NOTE — TELEPHONE ENCOUNTER
Comments: LVM for PT to schedule AWV    Last Office Visit (last PCP visit):   7/27/2023    Next Visit Date:  No future appointments.    **If hasn't been seen in over a year OR hasn't followed up according to last diabetes/ADHD visit, make appointment for patient before sending refill to provider.    Rx requested:  Requested Prescriptions     Pending Prescriptions Disp Refills    doxepin (SINEQUAN) 25 MG capsule [Pharmacy Med Name: DOXEPIN 25MG CAP 25 Capsule] 30 capsule 10     Sig: TAKE 1 CAPSULE BY MOUTH NIGHTLY    lisinopril (PRINIVIL;ZESTRIL) 20 MG tablet [Pharmacy Med Name: LISINOPRIL 20 MG TABLET 20 Tablet] 30 tablet 10     Sig: TAKE 1 TABLET BY MOUTH ONCE DAILY    torsemide (DEMADEX) 20 MG tablet [Pharmacy Med Name: TORSEMIDE 20MG TABLET 20 Tablet] 30 tablet 10     Sig: TAKE 1 TABLET BY MOUTH ONCE DAILY

## 2024-01-15 DIAGNOSIS — E11.9 DIABETES MELLITUS TYPE 2, INSULIN DEPENDENT (HCC): ICD-10-CM

## 2024-01-15 DIAGNOSIS — Z79.4 TYPE 2 DIABETES MELLITUS WITH STAGE 3 CHRONIC KIDNEY DISEASE, WITH LONG-TERM CURRENT USE OF INSULIN, UNSPECIFIED WHETHER STAGE 3A OR 3B CKD (HCC): ICD-10-CM

## 2024-01-15 DIAGNOSIS — Z79.4 DIABETES MELLITUS TYPE 2, INSULIN DEPENDENT (HCC): ICD-10-CM

## 2024-01-15 DIAGNOSIS — E11.22 TYPE 2 DIABETES MELLITUS WITH STAGE 3 CHRONIC KIDNEY DISEASE, WITH LONG-TERM CURRENT USE OF INSULIN, UNSPECIFIED WHETHER STAGE 3A OR 3B CKD (HCC): ICD-10-CM

## 2024-01-15 DIAGNOSIS — N18.30 TYPE 2 DIABETES MELLITUS WITH STAGE 3 CHRONIC KIDNEY DISEASE, WITH LONG-TERM CURRENT USE OF INSULIN, UNSPECIFIED WHETHER STAGE 3A OR 3B CKD (HCC): ICD-10-CM

## 2024-01-15 RX ORDER — TORSEMIDE 20 MG/1
20 TABLET ORAL DAILY
Qty: 30 TABLET | Refills: 0 | Status: SHIPPED | OUTPATIENT
Start: 2024-01-15

## 2024-01-15 RX ORDER — LISINOPRIL 20 MG/1
TABLET ORAL
Qty: 30 TABLET | Refills: 0 | Status: SHIPPED | OUTPATIENT
Start: 2024-01-15

## 2024-01-15 RX ORDER — DOXEPIN HYDROCHLORIDE 25 MG/1
25 CAPSULE ORAL NIGHTLY
Qty: 30 CAPSULE | Refills: 0 | Status: SHIPPED | OUTPATIENT
Start: 2024-01-15

## 2024-01-16 NOTE — TELEPHONE ENCOUNTER
Comments:     Last Office Visit (last PCP visit):   7/27/2023    Next Visit Date:  No future appointments.    **If hasn't been seen in over a year OR hasn't followed up according to last diabetes/ADHD visit, make appointment for patient before sending refill to provider.    Rx requested:  Requested Prescriptions     Pending Prescriptions Disp Refills    insulin lispro, 1 Unit Dial, (HUMALOG KWIKPEN) 100 UNIT/ML SOPN 15 mL 1     Sig: inject 7 units subcutaneously three times a day before meals    insulin glargine (LANTUS SOLOSTAR) 100 UNIT/ML injection pen 15 mL 10     Sig: Inject 20 Units into the skin

## 2024-01-23 RX ORDER — INSULIN LISPRO 100 [IU]/ML
INJECTION, SOLUTION INTRAVENOUS; SUBCUTANEOUS
Qty: 15 ML | Refills: 0 | Status: SHIPPED | OUTPATIENT
Start: 2024-01-23

## 2024-01-23 RX ORDER — INSULIN GLARGINE 100 [IU]/ML
20 INJECTION, SOLUTION SUBCUTANEOUS NIGHTLY
Qty: 15 ML | Refills: 0 | Status: SHIPPED | OUTPATIENT
Start: 2024-01-23

## 2024-02-10 DIAGNOSIS — E03.9 ACQUIRED HYPOTHYROIDISM: ICD-10-CM

## 2024-02-12 DIAGNOSIS — G47.00 INSOMNIA, UNSPECIFIED TYPE: ICD-10-CM

## 2024-02-12 DIAGNOSIS — I10 ESSENTIAL HYPERTENSION: ICD-10-CM

## 2024-02-12 NOTE — TELEPHONE ENCOUNTER
Comments: LVM for patient to call back. She is overdue for her AWV and DM 6 month follow up     Last Office Visit (last PCP visit):   7/27/2023    Next Visit Date:  No future appointments.    **If hasn't been seen in over a year OR hasn't followed up according to last diabetes/ADHD visit, make appointment for patient before sending refill to provider.    Rx requested:  Requested Prescriptions     Pending Prescriptions Disp Refills    levothyroxine (SYNTHROID) 125 MCG tablet [Pharmacy Med Name: LEVOTHYROXINE 125 MCG TABLET] 90 tablet 1     Sig: take 1 tablet by mouth once daily

## 2024-02-15 NOTE — TELEPHONE ENCOUNTER
Comments: LVM overdue for 6 month follow up DM    Last Office Visit (last PCP visit):   7/27/2023    Next Visit Date:  No future appointments.    **If hasn't been seen in over a year OR hasn't followed up according to last diabetes/ADHD visit, make appointment for patient before sending refill to provider.    Rx requested:  Requested Prescriptions     Pending Prescriptions Disp Refills    doxepin (SINEQUAN) 25 MG capsule [Pharmacy Med Name: DOXEPIN 25MG CAP 25 Capsule] 30 capsule 10     Sig: TAKE ONE (1) CAPSULE BY MOUTH NIGHTLY    torsemide (DEMADEX) 20 MG tablet [Pharmacy Med Name: TORSEMIDE 20MG TABLET 20 Tablet] 30 tablet 10     Sig: TAKE 1 TABLET BY MOUTH DAILY    lisinopril (PRINIVIL;ZESTRIL) 20 MG tablet [Pharmacy Med Name: LISINOPRIL 20 MG TABLET 20 Tablet] 30 tablet 10     Sig: TAKE 1 TABLET BY MOUTH DAILY    ONETOUCH VERIO strip [Pharmacy Med Name: ONE TOUCH VERIO 50CT TEST Strip] 50 strip 10     Sig: USE TO TEST BLOOD SUGAR TWICE DAILY

## 2024-02-19 RX ORDER — LEVOTHYROXINE SODIUM 0.12 MG/1
TABLET ORAL
Qty: 30 TABLET | Refills: 0 | Status: SHIPPED | OUTPATIENT
Start: 2024-02-19

## 2024-02-20 RX ORDER — LISINOPRIL 20 MG/1
TABLET ORAL
Qty: 30 TABLET | Refills: 0 | Status: SHIPPED | OUTPATIENT
Start: 2024-02-20

## 2024-02-20 RX ORDER — BLOOD SUGAR DIAGNOSTIC
STRIP MISCELLANEOUS
Qty: 50 STRIP | Refills: 0 | Status: SHIPPED | OUTPATIENT
Start: 2024-02-20

## 2024-02-20 RX ORDER — DOXEPIN HYDROCHLORIDE 25 MG/1
CAPSULE ORAL
Qty: 30 CAPSULE | Refills: 0 | Status: SHIPPED | OUTPATIENT
Start: 2024-02-20

## 2024-02-20 RX ORDER — TORSEMIDE 20 MG/1
20 TABLET ORAL DAILY
Qty: 30 TABLET | Refills: 0 | Status: SHIPPED | OUTPATIENT
Start: 2024-02-20

## 2024-02-26 NOTE — TELEPHONE ENCOUNTER
Comments:     Last Office Visit (last PCP visit):   7/27/2023    Next Visit Date:  Future Appointments   Date Time Provider Department Center   3/4/2024  1:30 PM Nichole Watkins PA [facility]       **If hasn't been seen in over a year OR hasn't followed up according to last diabetes/ADHD visit, make appointment for patient before sending refill to provider.    Rx requested:  Requested Prescriptions     Pending Prescriptions Disp Refills    Easy Touch Lancets 33G/Twist MISC [Pharmacy Med Name: EASY TOUCH TWIS 33G  33 GAUGE EACH] 100 each 10     Sig: USE TO TEST BLOOD SUGAR TWICE DAILY

## 2024-02-28 RX ORDER — LANCETS 33 GAUGE
EACH MISCELLANEOUS
Qty: 100 EACH | Refills: 10 | Status: SHIPPED | OUTPATIENT
Start: 2024-02-28

## 2024-03-12 DIAGNOSIS — E03.9 ACQUIRED HYPOTHYROIDISM: ICD-10-CM

## 2024-03-12 RX ORDER — LEVOTHYROXINE SODIUM 0.12 MG/1
TABLET ORAL
Qty: 30 TABLET | Refills: 0 | Status: SHIPPED | OUTPATIENT
Start: 2024-03-12

## 2024-03-12 NOTE — TELEPHONE ENCOUNTER
LVM for patient to call the office back to schedule her AWV, lab work, and DM follow up.    Comments:     Last Office Visit (last PCP visit):   7/27/2023    Next Visit Date:  No future appointments.    **If hasn't been seen in over a year OR hasn't followed up according to last diabetes/ADHD visit, make appointment for patient before sending refill to provider.    Rx requested:  Requested Prescriptions     Pending Prescriptions Disp Refills    levothyroxine (SYNTHROID) 125 MCG tablet [Pharmacy Med Name: LEVOTHYROXINE 125 MCG TABLET] 30 tablet 0     Sig: take 1 tablet by mouth once daily

## 2024-03-20 ENCOUNTER — TELEPHONE (OUTPATIENT)
Dept: FAMILY MEDICINE CLINIC | Age: 69
End: 2024-03-20

## 2024-03-20 NOTE — TELEPHONE ENCOUNTER
Called to complete medicare annual wellness visit. No answer, left a message, if calls back feel free to place on my schedule.

## 2024-03-25 ENCOUNTER — TELEPHONE (OUTPATIENT)
Dept: INTERNAL MEDICINE | Age: 69
End: 2024-03-25

## 2024-04-01 DIAGNOSIS — I10 ESSENTIAL HYPERTENSION: ICD-10-CM

## 2024-04-01 DIAGNOSIS — G47.00 INSOMNIA, UNSPECIFIED TYPE: ICD-10-CM

## 2024-04-02 NOTE — TELEPHONE ENCOUNTER
Comments:     Last Office Visit (last PCP visit):   7/27/2023    Next Visit Date:  No future appointments.    **If hasn't been seen in over a year OR hasn't followed up according to last diabetes/ADHD visit, make appointment for patient before sending refill to provider.    Rx requested:  Requested Prescriptions     Pending Prescriptions Disp Refills    doxepin (SINEQUAN) 25 MG capsule [Pharmacy Med Name: DOXEPIN 25MG CAP 25 Capsule] 30 capsule 10     Sig: TAKE 1 CAPSULE BY MOUTH NIGHTLY    lisinopril (PRINIVIL;ZESTRIL) 20 MG tablet [Pharmacy Med Name: LISINOPRIL 20 MG TABLET 20 Tablet] 30 tablet 10     Sig: TAKE 1 TABLET BY MOUTH DAILY    ONETOUCH VERIO strip [Pharmacy Med Name: ONE TOUCH VERIO 50CT TEST Strip] 50 strip 10     Sig: USE TO TEST BLOOD SUGAR TWICE DAILY    torsemide (DEMADEX) 20 MG tablet [Pharmacy Med Name: TORSEMIDE 20MG TABLET 20 Tablet] 30 tablet 10     Sig: TAKE 1 TABLET BY MOUTH DAILY

## 2024-04-09 DIAGNOSIS — E03.9 ACQUIRED HYPOTHYROIDISM: ICD-10-CM

## 2024-04-09 RX ORDER — BLOOD SUGAR DIAGNOSTIC
STRIP MISCELLANEOUS
Qty: 50 STRIP | Refills: 0 | Status: SHIPPED | OUTPATIENT
Start: 2024-04-09

## 2024-04-09 RX ORDER — LISINOPRIL 20 MG/1
TABLET ORAL
Qty: 30 TABLET | Refills: 0 | Status: SHIPPED | OUTPATIENT
Start: 2024-04-09

## 2024-04-09 RX ORDER — TORSEMIDE 20 MG/1
20 TABLET ORAL DAILY
Qty: 30 TABLET | Refills: 0 | Status: SHIPPED | OUTPATIENT
Start: 2024-04-09

## 2024-04-09 RX ORDER — DOXEPIN HYDROCHLORIDE 25 MG/1
25 CAPSULE ORAL NIGHTLY
Qty: 30 CAPSULE | Refills: 0 | Status: SHIPPED | OUTPATIENT
Start: 2024-04-09

## 2024-04-09 RX ORDER — LEVOTHYROXINE SODIUM 0.12 MG/1
TABLET ORAL
Qty: 30 TABLET | Refills: 0 | Status: SHIPPED | OUTPATIENT
Start: 2024-04-09

## 2024-04-09 NOTE — TELEPHONE ENCOUNTER
Comments: LVM for patient to call the office back to schedule an appointment. RX is for 30 days.    Last Office Visit (last PCP visit):   7/27/2023    Next Visit Date:  No future appointments.    **If hasn't been seen in over a year OR hasn't followed up according to last diabetes/ADHD visit, make appointment for patient before sending refill to provider.    Rx requested:  Requested Prescriptions     Pending Prescriptions Disp Refills    levothyroxine (SYNTHROID) 125 MCG tablet [Pharmacy Med Name: LEVOTHYROXINE 125 MCG TABLET] 30 tablet 0     Sig: take 1 tablet by mouth once daily

## 2024-04-23 ENCOUNTER — TELEPHONE (OUTPATIENT)
Dept: INTERNAL MEDICINE | Age: 69
End: 2024-04-23

## 2024-04-23 NOTE — TELEPHONE ENCOUNTER
Patient is in need of . Is that something we can do? They will need a referral for home services. Please contact [phone]    Thank you

## 2024-04-23 NOTE — TELEPHONE ENCOUNTER
Comments:     Last Office Visit (last PCP visit):   7/27/2023    Next Visit Date:  No future appointments.    **If hasn't been seen in over a year OR hasn't followed up according to last diabetes/ADHD visit, make appointment for patient before sending refill to provider.    Rx requested:  Requested Prescriptions     Pending Prescriptions Disp Refills    ONETOUCH VERIO strip [Pharmacy Med Name: ONE TOUCH VERIO 50CT TEST Strip] 50 strip 10     Sig: USE TO TEST BLOOD SUGAR TWICE DAILY

## 2024-04-24 DIAGNOSIS — I10 ESSENTIAL HYPERTENSION: ICD-10-CM

## 2024-04-24 DIAGNOSIS — G47.00 INSOMNIA, UNSPECIFIED TYPE: ICD-10-CM

## 2024-04-24 NOTE — TELEPHONE ENCOUNTER
Patient did call again today to see if Nichole had a chance to review the message. Patient was informed Nichole out of the office on Wednesday.     Thank you

## 2024-04-25 RX ORDER — BLOOD SUGAR DIAGNOSTIC
STRIP MISCELLANEOUS
Qty: 50 STRIP | Refills: 10 | Status: SHIPPED | OUTPATIENT
Start: 2024-04-25

## 2024-04-25 RX ORDER — BLOOD SUGAR DIAGNOSTIC
STRIP MISCELLANEOUS
Qty: 50 STRIP | Refills: 10 | OUTPATIENT
Start: 2024-04-25

## 2024-04-25 NOTE — TELEPHONE ENCOUNTER
Comments: called and left vm to schedule 6 month f/u     Last Office Visit (last PCP visit):   7/27/2023    Next Visit Date:  No future appointments.    **If hasn't been seen in over a year OR hasn't followed up according to last diabetes/ADHD visit, make appointment for patient before sending refill to provider.    Rx requested:  Requested Prescriptions     Pending Prescriptions Disp Refills    torsemide (DEMADEX) 20 MG tablet [Pharmacy Med Name: TORSEMIDE 20MG TABLET 20 Tablet] 30 tablet 10     Sig: TAKE 1 TABLET BY MOUTH DAILY    lisinopril (PRINIVIL;ZESTRIL) 20 MG tablet [Pharmacy Med Name: LISINOPRIL 20 MG TABLET 20 Tablet] 30 tablet 10     Sig: TAKE 1 TABLET BY MOUTH DAILY    doxepin (SINEQUAN) 25 MG capsule [Pharmacy Med Name: DOXEPIN 25MG CAP 25 Capsule] 30 capsule 10     Sig: TAKE 1 CAPSULE BY MOUTH NIGHTLY

## 2024-04-25 NOTE — TELEPHONE ENCOUNTER
Comments: (possible duplicate)     Last Office Visit (last PCP visit):   7/27/2023    Next Visit Date:  No future appointments.    **If hasn't been seen in over a year OR hasn't followed up according to last diabetes/ADHD visit, make appointment for patient before sending refill to provider.    Rx requested:  Requested Prescriptions     Pending Prescriptions Disp Refills    ONETOUCH VERIO strip [Pharmacy Med Name: ONE TOUCH VERIO 50CT TEST Strip] 50 strip 10     Sig: USE TO TEST BLOOD SUGAR TWICE DAILY

## 2024-05-04 RX ORDER — DOXEPIN HYDROCHLORIDE 25 MG/1
25 CAPSULE ORAL NIGHTLY
Qty: 30 CAPSULE | Refills: 0 | Status: SHIPPED | OUTPATIENT
Start: 2024-05-04

## 2024-05-04 RX ORDER — LISINOPRIL 20 MG/1
TABLET ORAL
Qty: 30 TABLET | Refills: 0 | Status: SHIPPED | OUTPATIENT
Start: 2024-05-04

## 2024-05-04 RX ORDER — TORSEMIDE 20 MG/1
20 TABLET ORAL DAILY
Qty: 30 TABLET | Refills: 0 | Status: SHIPPED | OUTPATIENT
Start: 2024-05-04

## 2024-05-12 DIAGNOSIS — E03.9 ACQUIRED HYPOTHYROIDISM: ICD-10-CM

## 2024-05-13 NOTE — TELEPHONE ENCOUNTER
Comments:     Last Office Visit (last PCP visit):   7/27/2023    Next Visit Date:  No future appointments.    **If hasn't been seen in over a year OR hasn't followed up according to last diabetes/ADHD visit, make appointment for patient before sending refill to provider.    Rx requested:  Requested Prescriptions     Pending Prescriptions Disp Refills    levothyroxine (SYNTHROID) 125 MCG tablet [Pharmacy Med Name: LEVOTHYROXINE 125 MCG TABLET] 30 tablet 0     Sig: take 1 tablet by mouth once daily

## 2024-05-16 RX ORDER — LEVOTHYROXINE SODIUM 0.12 MG/1
TABLET ORAL
Qty: 30 TABLET | Refills: 0 | OUTPATIENT
Start: 2024-05-16

## 2024-05-17 DIAGNOSIS — G47.00 INSOMNIA, UNSPECIFIED TYPE: ICD-10-CM

## 2024-05-17 DIAGNOSIS — I10 ESSENTIAL HYPERTENSION: ICD-10-CM

## 2024-05-20 ENCOUNTER — TELEPHONE (OUTPATIENT)
Dept: INTERNAL MEDICINE | Age: 69
End: 2024-05-20

## 2024-05-20 NOTE — TELEPHONE ENCOUNTER
Comments: unable to leave VM phone just rings and then hangs up    Last Office Visit (last PCP visit):   7/27/2023    Next Visit Date:  No future appointments.    **If hasn't been seen in over a year OR hasn't followed up according to last diabetes/ADHD visit, make appointment for patient before sending refill to provider.    Rx requested:  Requested Prescriptions     Pending Prescriptions Disp Refills    doxepin (SINEQUAN) 25 MG capsule [Pharmacy Med Name: DOXEPIN 25MG CAP 25 Capsule] 30 capsule 10     Sig: TAKE 1 CAPSULE BY MOUTH NIGHTLY    lisinopril (PRINIVIL;ZESTRIL) 20 MG tablet [Pharmacy Med Name: LISINOPRIL 20 MG TABLET 20 Tablet] 30 tablet 10     Sig: TAKE 1 TABLET BY MOUTH DAILY    torsemide (DEMADEX) 20 MG tablet [Pharmacy Med Name: TORSEMIDE 20 MG TABS 20 Tablet] 30 tablet 10     Sig: TAKE 1 TABLET BY MOUTH DAILY

## 2024-05-20 NOTE — TELEPHONE ENCOUNTER
Pt son called and she was just released from Rehab today, and [facility] is requesting a referral for Home Health and PT.  She is being seen by [facility].  Son will call back with where she wants to go after he talks to Damián

## 2024-05-22 NOTE — TELEPHONE ENCOUNTER
I spoke with patient. She has been at [facility] for two months with cancer. The hospital is not supplying her Levothyroxine to her at this time and she still has some but will need a refill soon. She is asking if you can please send a refill of Levothyroxine until she is out of the hospital to [facility] in [place] on [facility].

## 2024-05-22 NOTE — TELEPHONE ENCOUNTER
Patient is aware she needs to schedule an appointment. Patient states she is currently in the hospital and will call back in to schedule once she is released. Rx's changed to 30 days.

## 2024-05-23 DIAGNOSIS — G47.00 INSOMNIA, UNSPECIFIED TYPE: ICD-10-CM

## 2024-05-23 DIAGNOSIS — I10 ESSENTIAL HYPERTENSION: ICD-10-CM

## 2024-05-23 RX ORDER — TORSEMIDE 20 MG/1
20 TABLET ORAL DAILY
Qty: 30 TABLET | Refills: 0 | OUTPATIENT
Start: 2024-05-23

## 2024-05-23 RX ORDER — LISINOPRIL 20 MG/1
TABLET ORAL
Qty: 30 TABLET | Refills: 0 | OUTPATIENT
Start: 2024-05-23

## 2024-05-23 RX ORDER — LEVOTHYROXINE SODIUM 0.12 MG/1
125 TABLET ORAL DAILY
Qty: 30 TABLET | Refills: 0 | Status: SHIPPED | OUTPATIENT
Start: 2024-05-23

## 2024-05-23 RX ORDER — LEVOTHYROXINE SODIUM 0.12 MG/1
125 TABLET ORAL DAILY
Qty: 30 TABLET | Refills: 0 | Status: SHIPPED | OUTPATIENT
Start: 2024-05-23 | End: 2024-05-23

## 2024-05-23 RX ORDER — DOXEPIN HYDROCHLORIDE 25 MG/1
25 CAPSULE ORAL NIGHTLY
Qty: 30 CAPSULE | Refills: 0 | OUTPATIENT
Start: 2024-05-23

## 2024-05-23 NOTE — TELEPHONE ENCOUNTER
She is inpatient  I can not give her medications to take.  The hospital will give her what she needs while inpatient

## 2024-05-28 NOTE — TELEPHONE ENCOUNTER
Comments:     Last Office Visit (last PCP visit):   7/27/2023    Next Visit Date:  No future appointments.    **If hasn't been seen in over a year OR hasn't followed up according to last diabetes/ADHD visit, make appointment for patient before sending refill to provider.    Rx requested:  Requested Prescriptions     Pending Prescriptions Disp Refills    lisinopril (PRINIVIL;ZESTRIL) 20 MG tablet [Pharmacy Med Name: LISINOPRIL 20 MG TABLET 20 Tablet] 30 tablet 10     Sig: TAKE 1 TABLET BY MOUTH DAILY    torsemide (DEMADEX) 20 MG tablet [Pharmacy Med Name: TORSEMIDE 20 MG TABS 20 Tablet] 30 tablet 10     Sig: TAKE 1 TABLET BY MOUTH DAILY    doxepin (SINEQUAN) 25 MG capsule [Pharmacy Med Name: DOXEPIN 25MG CAP 25 Capsule] 30 capsule 10     Sig: TAKE 1 CAPSULE BY MOUTH NIGHTLY

## 2024-05-29 RX ORDER — DOXEPIN HYDROCHLORIDE 25 MG/1
25 CAPSULE ORAL NIGHTLY
Qty: 30 CAPSULE | Refills: 10 | OUTPATIENT
Start: 2024-05-29

## 2024-05-29 RX ORDER — TORSEMIDE 20 MG/1
20 TABLET ORAL DAILY
Qty: 30 TABLET | Refills: 10 | OUTPATIENT
Start: 2024-05-29

## 2024-05-29 RX ORDER — LISINOPRIL 20 MG/1
TABLET ORAL
Qty: 30 TABLET | Refills: 10 | OUTPATIENT
Start: 2024-05-29

## 2024-06-17 NOTE — TELEPHONE ENCOUNTER
Comments: LVM for patient to call the office back. She is due for AWV and DM.    Last Office Visit (last PCP visit):   7/27/2023    Next Visit Date:  No future appointments.    **If hasn't been seen in over a year OR hasn't followed up according to last diabetes/ADHD visit, make appointment for patient before sending refill to provider.    Rx requested:  Requested Prescriptions     Pending Prescriptions Disp Refills    Alcohol Swabs (EASY TOUCH ALCOHOL PREP MEDIUM) 70 % PADS [Pharmacy Med Name: ALCOHOL PREP PADS 100CT PAD]  10     Sig: USE AS DIRECTED SIX TIMES DAILY

## 2024-06-18 RX ORDER — ISOPROPYL ALCOHOL 70 ML/100ML
SWAB TOPICAL
Qty: 200 EACH | Refills: 0 | Status: SHIPPED | OUTPATIENT
Start: 2024-06-18

## 2024-07-12 NOTE — TELEPHONE ENCOUNTER
Comments:     Last Office Visit (last PCP visit):   7/27/2023    Next Visit Date:  No future appointments.    **If hasn't been seen in over a year OR hasn't followed up according to last diabetes/ADHD visit, make appointment for patient before sending refill to provider.    Rx requested:  Requested Prescriptions     Pending Prescriptions Disp Refills    Alcohol Swabs (EASY TOUCH ALCOHOL PREP MEDIUM) 70 % PADS [Pharmacy Med Name: ALCOHOL PREP PADS 100CT PAD]  10     Sig: USE AS DIRECTED SIX TIMES DAILY

## 2024-07-15 RX ORDER — ISOPROPYL ALCOHOL 70 ML/100ML
SWAB TOPICAL
Refills: 10 | OUTPATIENT
Start: 2024-07-15